# Patient Record
Sex: FEMALE | Race: WHITE
[De-identification: names, ages, dates, MRNs, and addresses within clinical notes are randomized per-mention and may not be internally consistent; named-entity substitution may affect disease eponyms.]

---

## 2017-05-11 ENCOUNTER — HOSPITAL ENCOUNTER (OUTPATIENT)
Dept: HOSPITAL 45 - C.LABBC | Age: 82
Discharge: HOME | End: 2017-05-11
Attending: FAMILY MEDICINE
Payer: COMMERCIAL

## 2017-05-11 DIAGNOSIS — M85.842: ICD-10-CM

## 2017-05-11 DIAGNOSIS — M25.50: Primary | ICD-10-CM

## 2017-05-11 DIAGNOSIS — M19.042: ICD-10-CM

## 2017-05-11 DIAGNOSIS — M19.041: ICD-10-CM

## 2017-05-11 DIAGNOSIS — M85.841: ICD-10-CM

## 2017-05-11 NOTE — DIAGNOSTIC IMAGING REPORT
LEFT FOREARM 2 VIEWS ROUTINE



CLINICAL HISTORY: POLYARTHRALGIA pain



COMPARISON: None.



DISCUSSION: The bones and joint spaces appear intact. There is no evidence of

fracture, dislocation or bony disease. There is no evidence for soft tissue

swelling.



IMPRESSION: Negative study.







Electronically signed by:  Herman Maurer M.D.

5/11/2017 4:28 PM



Dictated Date/Time:  5/11/2017 4:28 PM

## 2017-05-11 NOTE — DIAGNOSTIC IMAGING REPORT
RIGHT ELBOW MIN 3 VIEWS ROUTINE



CLINICAL HISTORY: Right elbow pain.    



COMPARISON: None



FINDINGS:  Alignment of the right elbow is anatomic. There is no joint effusion.

No fracture is identified. There is minimal narrowing of the radiocapitellar

joint space. There is mild soft tissue swelling overlying olecranon.



IMPRESSION: 



1. No acute fracture or joint effusion of the right elbow.



2. Minimal soft tissue swelling overlying the olecranon.







Electronically signed by:  Piero Cornelius M.D.

5/11/2017 4:24 PM



Dictated Date/Time:  5/11/2017 4:23 PM

## 2017-05-11 NOTE — DIAGNOSTIC IMAGING REPORT
RIGHT HAND MIN 3 VIEWS ROUTINE



CLINICAL HISTORY: POLYARTHRALGIA

Right pain



COMPARISON: None.



DISCUSSION: Generalized degenerative change. Osteopenia. Considerable

degenerative change first as well as second carpal metacarpal joint. There is no

evidence for soft tissue swelling.



IMPRESSION: Considerable degenerative change. Osteopenia.







Electronically signed by:  Herman Maurer M.D.

5/11/2017 4:28 PM



Dictated Date/Time:  5/11/2017 4:24 PM

## 2017-05-11 NOTE — DIAGNOSTIC IMAGING REPORT
RIGHT FOREARM 2 VIEWS ROUTINE



CLINICAL HISTORY: POLYARTHRALGIA

Right pain



COMPARISON: None.



DISCUSSION: The bones and joint spaces appear intact. There is no evidence of

fracture, dislocation or bony disease. There is no evidence for soft tissue

swelling.



IMPRESSION: Negative study.







Electronically signed by:  Herman Maurer M.D.

5/11/2017 4:23 PM



Dictated Date/Time:  5/11/2017 4:23 PM

## 2017-05-11 NOTE — DIAGNOSTIC IMAGING REPORT
LEFT HAND MIN 3 VIEWS ROUTINE



CLINICAL HISTORY: POLYARTHRALGIA pain



COMPARISON: None.



DISCUSSION: Generalized degenerative change. Osteopenia. Significant

degenerative change of the distal interphalangeal joints as well as the first

and to lesser extent second carpometacarpal joint. There is no evidence for soft

tissue swelling.



IMPRESSION: Considerable degenerative change. Osteopenia.







Electronically signed by:  Herman Maurer M.D.

5/11/2017 4:24 PM



Dictated Date/Time:  5/11/2017 4:24 PM

## 2017-05-11 NOTE — DIAGNOSTIC IMAGING REPORT
LEFT ELBOW MIN 3 VIEWS ROUTINE



CLINICAL HISTORY: Left elbow pain.    



COMPARISON: None



FINDINGS:  Alignment of left elbow is anatomic. There is no fracture. There is

no evidence for joint effusion. No osseous lesion is identified. There is

minimal soft tissue swelling overlying olecranon.



IMPRESSION: 



1. No acute fracture or joint effusion of the left elbow.



2. Minimal soft tissue swelling overlying the olecranon.







Electronically signed by:  Piero Cornelius M.D.

5/11/2017 4:25 PM



Dictated Date/Time:  5/11/2017 4:24 PM

## 2017-07-05 ENCOUNTER — HOSPITAL ENCOUNTER (EMERGENCY)
Dept: HOSPITAL 45 - C.EDA | Age: 82
LOS: 1 days | Discharge: HOME | End: 2017-07-06
Payer: COMMERCIAL

## 2017-07-05 VITALS
HEIGHT: 64.02 IN | BODY MASS INDEX: 20.17 KG/M2 | HEIGHT: 64.02 IN | WEIGHT: 118.17 LBS | WEIGHT: 118.17 LBS | BODY MASS INDEX: 20.17 KG/M2

## 2017-07-05 DIAGNOSIS — E78.5: ICD-10-CM

## 2017-07-05 DIAGNOSIS — I10: ICD-10-CM

## 2017-07-05 DIAGNOSIS — Z82.49: ICD-10-CM

## 2017-07-05 DIAGNOSIS — Z98.51: ICD-10-CM

## 2017-07-05 DIAGNOSIS — I35.9: ICD-10-CM

## 2017-07-05 DIAGNOSIS — Z91.018: ICD-10-CM

## 2017-07-05 DIAGNOSIS — R07.89: ICD-10-CM

## 2017-07-05 DIAGNOSIS — R00.2: Primary | ICD-10-CM

## 2017-07-05 DIAGNOSIS — R53.83: ICD-10-CM

## 2017-07-05 DIAGNOSIS — I48.91: ICD-10-CM

## 2017-07-05 DIAGNOSIS — Z88.8: ICD-10-CM

## 2017-07-05 DIAGNOSIS — I44.0: ICD-10-CM

## 2017-07-05 DIAGNOSIS — Z80.9: ICD-10-CM

## 2017-07-05 DIAGNOSIS — Z79.82: ICD-10-CM

## 2017-07-05 DIAGNOSIS — Z79.899: ICD-10-CM

## 2017-07-05 DIAGNOSIS — Z88.0: ICD-10-CM

## 2017-07-06 VITALS — OXYGEN SATURATION: 96 % | SYSTOLIC BLOOD PRESSURE: 142 MMHG | HEART RATE: 65 BPM | DIASTOLIC BLOOD PRESSURE: 52 MMHG

## 2017-07-06 VITALS — OXYGEN SATURATION: 97 % | TEMPERATURE: 97.7 F

## 2017-07-06 LAB
ANION GAP SERPL CALC-SCNC: 9 MMOL/L (ref 3–11)
BASOPHILS # BLD: 0.02 K/UL (ref 0–0.2)
BASOPHILS NFR BLD: 0.3 %
BUN SERPL-MCNC: 11 MG/DL (ref 7–18)
BUN/CREAT SERPL: 17.4 (ref 10–20)
CALCIUM SERPL-MCNC: 9.4 MG/DL (ref 8.5–10.1)
CHLORIDE SERPL-SCNC: 103 MMOL/L (ref 98–107)
CKMB/CK RATIO: 2.3 (ref 0–3)
CO2 SERPL-SCNC: 26 MMOL/L (ref 21–32)
COMPLETE: YES
CREAT CL PREDICTED SERPL C-G-VRATE: 59.3 ML/MIN
CREAT SERPL-MCNC: 0.63 MG/DL (ref 0.6–1.2)
EOSINOPHIL NFR BLD AUTO: 222 K/UL (ref 130–400)
GLUCOSE SERPL-MCNC: 104 MG/DL (ref 70–99)
HCT VFR BLD CALC: 38.7 % (ref 37–47)
IG%: 0.3 %
IMM GRANULOCYTES NFR BLD AUTO: 28.1 %
INR PPP: 1 (ref 0.9–1.1)
LYMPHOCYTES # BLD: 1.96 K/UL (ref 1.2–3.4)
MAGNESIUM SERPL-MCNC: 2.2 MG/DL (ref 1.8–2.4)
MCH RBC QN AUTO: 32.2 PG (ref 25–34)
MCHC RBC AUTO-ENTMCNC: 35.1 G/DL (ref 32–36)
MCV RBC AUTO: 91.7 FL (ref 80–100)
MONOCYTES NFR BLD: 11.3 %
NEUTROPHILS # BLD AUTO: 1 %
NEUTROPHILS NFR BLD AUTO: 59 %
PARTIAL THROMBOPLASTIN RATIO: 1
PMV BLD AUTO: 11.4 FL (ref 7.4–10.4)
POTASSIUM SERPL-SCNC: 3.8 MMOL/L (ref 3.5–5.1)
PROTHROMBIN TIME: 10.6 SECONDS (ref 9–12)
RBC # BLD AUTO: 4.22 M/UL (ref 4.2–5.4)
SODIUM SERPL-SCNC: 138 MMOL/L (ref 136–145)
WBC # BLD AUTO: 6.98 K/UL (ref 4.8–10.8)

## 2017-07-06 NOTE — EMERGENCY ROOM VISIT NOTE
History


Report prepared by Jamia:  Shaista Tyler


Under the Supervision of:  Dr. Kevyn Gold M.D.


First contact with patient:  00:07


Chief Complaint:  CARDIAC ASSESSMENT


Stated Complaint:  ABDOMINAL PAIN/CHEST PAIN


Nursing Triage Summary:  


Patient arrived ALS for evaluation of "not feeling well" for days. Patient 


reports an unusual feeling in stomach, denies it as pain and it sometimes it 


goes into her chest. Patient had drs nathaniel scheduled for tomorrow morning but 


stated she couldn't wait that long. Denies any other symptoms.





History of Present Illness


The patient is a 82 year old female who presents to the Emergency Room with 

complaints of intermittent irregular heart beat beginning 2 days ago. The 

patient states that she has an appointment with Dr. Fisher tomorrow morning and 

she called him today when she began to experience symptoms. She notes that he 

advised that she wait until her appointment tomorrow but after prolonged 

symptoms she decided to come in to the ED. She reports that over the last 2 

days she has been feeling her heart beating irregularly. She notes that she 

took Eliquis 2 days ago after feeling her atrial fibrillation act up and 

experienced dizziness that she has had before after taking Eliquis in the 

hospital. The patient states that she has also been having a discomfort in her 

stomach and chest that she reports is not painful at all. She notes that she 

has been taking Metoprolol and is taking Tumeric 3 times a day that she 

believes may be causing her symptoms. She reports that she got a flu shot 1 

year ago that has caused her to have muscular pains as well. She denies any 

chest pain.





   Source of History:  patient


   Onset:  2 days ago


   Position:  other (irregular heart beat)


   Timing:  intermittent


   Associated Symptoms:  No chest pain


Note:


She complains of abdominal discomfort radiating to the chest and dizziness.





Review of Systems


See HPI for pertinent positives & negatives. A total of 10 systems reviewed and 

were otherwise negative.





Past Medical & Surgical


Medical Problems:


(1) Aortic Valve Disorder


(2) Atrial fibrillation with rapid ventricular response


(3) History of repair of coarctation of aorta


(4) Hyperlipidemia Nec/Nos


(5) Hypertension Nos


(6) Patent ductus arteriosus


(7) Tubal Ligation Status








Family History





FHx: cancer


FHx: heart disease





Social History


Smoking Status:  Never Smoker


Alcohol Use:  none


Drug Use:  none


Marital Status:  


Housing Status:  lives alone


Occupation Status:  retired





Current/Historical Medications


Scheduled


Ascorbic Acid (Vitamin C), 500 MG PO DAILY


Aspirin (Aspirin Ec), 81 MG PO DAILY


Calcium W/ Magnesium (Calcium Magnesium 750), 1 TAB PO DAILY


Metoprolol Succinate (Metoprolol Succinate ER), 25 MG PO BID


Vitamin E (E-400), 400 UNITS PO DAILY


[Tumeric], Unknown Dose PO DAILY





Scheduled PRN


Trolamine Salicylate (Aspercreme), 1 APPLN TOP AS DIRECTED PRN for Pain





Allergies


Coded Allergies:  


     Epinephrine (Verified  Allergy, Severe, TACHYCARDIA, 7/6/17)


     Apixaban (Verified  Allergy, Unknown, WEAKNESS/DIFFICULTY BREATHING, 7/6/17

)


     Honey (Verified  Allergy, Unknown, ANAPHYLAXIS, 7/6/17)


 wild flower honey


     Penicillins (Verified  Allergy, Unknown, UNKNOWN, 7/6/17)


     Flu Virus Vaccine (Verified  Adverse Reaction, Intermediate, "ARTHRITIS 

SYMPTOMS", 7/6/17)


     Cyclobenzaprine (Verified  Adverse Reaction, Unknown, lethargy, 7/6/17)





Physical Exam


Vital Signs











  Date Time  Temp Pulse Resp B/P (MAP) Pulse Ox O2 Delivery O2 Flow Rate FiO2


 


7/6/17 01:30  65 18 142/52 96 Room Air  


 


7/6/17 00:46  62      


 


7/6/17 00:03 36.5 67 18 156/73 97 Room Air  


 


7/6/17 00:03     97 Room Air  











Physical Exam


GENERAL: Patient is elderly appearing and in no acute distress.


HEENT: No acute trauma, normocephalic atraumatic, mucous membranes moist, no 

nasal congestion, no scleral icterus.


NECK: No stridor, no adenopathy, no meningismus, trachea is midline.


LUNGS: No dyspnea. Clear to auscultation and equal bilaterally. No wheeze, no 

rhonchi.


HEART: Regular rate and rhythm.  No rubs, gallops appreciated. Systolic murmur. 


ABDOMEN: Soft, nontender, bowel sounds positive, no masses appreciated, no 

peritonitis.


BACK: No midline tenderness, no CVA tenderness


EXTREMITIES: Normal motion all extremities, no cyanosis, no edema.


NEUROLOGIC: Alert and oriented, no acute motor or sensory deficits, no focal 

weakness, cranial nerves grossly intact.


SKIN: No rash, no jaundice, no diaphoresis.





Medical Decision & Procedures


ER Provider


Diagnostic Interpretation:


X ray results are stated below per my interpretation:


Chest: 1 view: No infiltrate, no effusion, normal cardiac border.  Scarring/

atelectasis in left lower lobe similar to previous chest x-ray.





Laboratory Results


7/5/17 23:50








Red Blood Count 4.22, Mean Corpuscular Volume 91.7, Mean Corpuscular Hemoglobin 

32.2, Mean Corpuscular Hemoglobin Concent 35.1, Mean Platelet Volume 11.4, 

Neutrophils (%) (Auto) 59.0, Lymphocytes (%) (Auto) 28.1, Monocytes (%) (Auto) 

11.3, Eosinophils (%) (Auto) 1.0, Basophils (%) (Auto) 0.3, Neutrophils # (Auto

) 4.12, Lymphocytes # (Auto) 1.96, Monocytes # (Auto) 0.79, Eosinophils # (Auto

) 0.07, Basophils # (Auto) 0.02





7/5/17 23:50

















Test


  7/5/17


23:50


 


White Blood Count


  6.98 K/uL


(4.8-10.8)


 


Red Blood Count


  4.22 M/uL


(4.2-5.4)


 


Hemoglobin


  13.6 g/dL


(12.0-16.0)


 


Hematocrit 38.7 % (37-47) 


 


Mean Corpuscular Volume


  91.7 fL


()


 


Mean Corpuscular Hemoglobin


  32.2 pg


(25-34)


 


Mean Corpuscular Hemoglobin


Concent 35.1 g/dl


(32-36)


 


Platelet Count


  222 K/uL


(130-400)


 


Mean Platelet Volume


  11.4 fL


(7.4-10.4)


 


Neutrophils (%) (Auto) 59.0 % 


 


Lymphocytes (%) (Auto) 28.1 % 


 


Monocytes (%) (Auto) 11.3 % 


 


Eosinophils (%) (Auto) 1.0 % 


 


Basophils (%) (Auto) 0.3 % 


 


Neutrophils # (Auto)


  4.12 K/uL


(1.4-6.5)


 


Lymphocytes # (Auto)


  1.96 K/uL


(1.2-3.4)


 


Monocytes # (Auto)


  0.79 K/uL


(0.11-0.59)


 


Eosinophils # (Auto)


  0.07 K/uL


(0-0.5)


 


Basophils # (Auto)


  0.02 K/uL


(0-0.2)


 


RDW Standard Deviation


  43.7 fL


(36.4-46.3)


 


RDW Coefficient of Variation


  13.0 %


(11.5-14.5)


 


Immature Granulocyte % (Auto) 0.3 % 


 


Immature Granulocyte # (Auto)


  0.02 K/uL


(0.00-0.02)


 


Prothrombin Time


  10.6 SECONDS


(9.0-12.0)


 


Prothromb Time International


Ratio 1.0 (0.9-1.1) 


 


 


Activated Partial


Thromboplast Time 26.0 SECONDS


(21.0-31.0)


 


Partial Thromboplastin Ratio 1.0 


 


Anion Gap


  9.0 mmol/L


(3-11)


 


Est Creatinine Clear Calc


Drug Dose 59.3 ml/min 


 


 


Estimated GFR (


American) 97.5 


 


 


Estimated GFR (Non-


American 84.1 


 


 


BUN/Creatinine Ratio 17.4 (10-20) 


 


Calcium Level


  9.4 mg/dl


(8.5-10.1)


 


Magnesium Level


  2.2 mg/dl


(1.8-2.4)


 


Total Creatine Kinase


  99 U/L


()


 


Creatine Kinase MB


  2.3 ng/ml


(0.5-3.6)


 


Creatine Kinase MB Ratio 2.3 (0-3.0) 


 


Troponin I


  0.018 ng/ml


(0-0.045)





Laboratory results as reviewed by me.





ECG


Indication:  other (irregular heart rate)


Rate (beats per minute):  65


Rhythm:  normal sinus


Findings:  1st degree AV block, no acute ischemic change, no ectopy





ED Course


0007: The patient was evaluated in room A12B. A complete history and physical 

exam was performed.





0101: I reevaluated the patient. She refuses to stay in the hospital but will 

stay on the monitor for another half hour. She will follow up with her doctor 

tomorrow and understands that we have not completely ruled out a heart attack 

or other abnormality. 





0130: Reevaluated the patient. Discussed results and discharge instructions: 

She verbalized understanding and agreement.   The patient is ready for 

discharge.





Medical Decision


Differential: NSR, SVT, PACs, PVCs, Cardiac Dysrhythmia, Endocrine Dysfunction, 

Eletrolyte/Metabolic Abnormality, Pulmonary Embolism, Infectious, GI, amongst 

other pathologies entertained.





Medication Reconciliation:  I attest that I have personally reviewed the patient

's current medication list.


Blood pressure screening: Patient was found to have an elevated blood pressure 

and was referred to their cardiologist in the morning for recheck and further 

treatment.





82 yr old female arrives for evaluation of periodic palpitations over the last 

few days associated with fatigue, lack of sleep, and chest/body discomfort.  

Wide ranging complaints from bed not comfortable to room being too cold that 

seem impossible to rectify at this time.  She has unremarkable work-up, no 

palpitations here.  Advised coming in for monitoring and further evaluation.  

Admits not taking her full dose of metoprolol as regularly as she was supposed 

to.  She refuses to stay for further monitoring even after extensive 

discussions.  She has appointment in AM and is aware she can return at any time 

if worsening or other concerns.





Impression





 Primary Impression:  


 Intermittent palpitations


 Additional Impressions:  


 Chest discomfort


 Fatigue





Scribe Attestation


The scribe's documentation has been prepared under my direction and personally 

reviewed by me in its entirety. I confirm that the note above accurately 

reflects all work, treatment, procedures, and medical decision making performed 

by me.





Departure Information


Dispostion


Home / Self-Care





Referrals


Noemy Burns D.O. (PCP)





Forms


IMPORTANT VISIT INFORMATION





Patient Instructions


My [facility]





Additional Instructions





Keep your appointment with your cardiologist for tomorrow morning.


Return if you wish further evaluation, or call 911 if worsening symptoms.


We are always here to help.





Your blood pressure was elevated during this visit.  This is quite common in 

many people who are being evaluated in the Emergency Department for many 

reasons.  However, it is important that you have your Primary Care Provider 

recheck your blood pressure and discuss whether treatment will be needed.  


Long term elevated blood pressure can lead to strokes, heart attacks, kidney 

failure amongst other medical issues.  If you develop severe headaches, chest 

pain, weakness in arms or legs, or other concerning symptoms call 911.





Problem Qualifiers

## 2017-07-06 NOTE — DIAGNOSTIC IMAGING REPORT
SINGLE VIEW CHEST



CLINICAL HISTORY:  Atypical chest pain.



FINDINGS: An AP, portable, upright chest radiograph is compared to study dated

8/18/2016 and correlated with chest CT dated 7/25/2016. The examination is

degraded by portable technique and patient rotation.  The heart is enlarged and

there is atherosclerotic calcification of the thoracic aorta. The pulmonary

vasculature is noncongested. Chronic interstitial thickening is unchanged. There

is chronic elevation of left hemidiaphragm and bibasilar atelectasis. No

airspace consolidation or pleural effusion is identified. No pneumothorax is

seen. The skeletal structures are osteopenic. The bony thorax is grossly intact.



IMPRESSION: Cardiomegaly with no acute cardiopulmonary abnormality.







Electronically signed by:  Javier Rendon M.D.

7/6/2017 7:28 AM



Dictated Date/Time:  7/6/2017 7:27 AM

## 2017-10-06 ENCOUNTER — HOSPITAL ENCOUNTER (OUTPATIENT)
Dept: HOSPITAL 45 - C.LABBC | Age: 82
Discharge: HOME | End: 2017-10-06
Payer: COMMERCIAL

## 2017-10-06 DIAGNOSIS — E87.6: Primary | ICD-10-CM

## 2017-10-06 LAB
ANION GAP SERPL CALC-SCNC: 6 MMOL/L (ref 3–11)
BUN SERPL-MCNC: 13 MG/DL (ref 7–18)
BUN/CREAT SERPL: 19.6 (ref 10–20)
CALCIUM SERPL-MCNC: 9 MG/DL (ref 8.5–10.1)
CHLORIDE SERPL-SCNC: 103 MMOL/L (ref 98–107)
CO2 SERPL-SCNC: 27 MMOL/L (ref 21–32)
CREAT SERPL-MCNC: 0.67 MG/DL (ref 0.6–1.2)
GLUCOSE SERPL-MCNC: 89 MG/DL (ref 70–99)
MAGNESIUM SERPL-MCNC: 2.3 MG/DL (ref 1.8–2.4)
POTASSIUM SERPL-SCNC: 4.5 MMOL/L (ref 3.5–5.1)
SODIUM SERPL-SCNC: 136 MMOL/L (ref 136–145)

## 2017-11-09 ENCOUNTER — HOSPITAL ENCOUNTER (INPATIENT)
Dept: HOSPITAL 45 - C.EDA | Age: 82
LOS: 2 days | Discharge: HOME HEALTH | DRG: 310 | End: 2017-11-11
Attending: HOSPITALIST | Admitting: HOSPITALIST
Payer: COMMERCIAL

## 2017-11-09 VITALS
OXYGEN SATURATION: 97 % | DIASTOLIC BLOOD PRESSURE: 59 MMHG | TEMPERATURE: 97.52 F | SYSTOLIC BLOOD PRESSURE: 104 MMHG | HEART RATE: 78 BPM

## 2017-11-09 VITALS
HEIGHT: 64 IN | BODY MASS INDEX: 19.76 KG/M2 | WEIGHT: 115.74 LBS | BODY MASS INDEX: 19.76 KG/M2 | HEIGHT: 64 IN | WEIGHT: 115.74 LBS

## 2017-11-09 VITALS — HEART RATE: 69 BPM | SYSTOLIC BLOOD PRESSURE: 93 MMHG | TEMPERATURE: 98.06 F | DIASTOLIC BLOOD PRESSURE: 80 MMHG

## 2017-11-09 VITALS
HEART RATE: 83 BPM | OXYGEN SATURATION: 94 % | DIASTOLIC BLOOD PRESSURE: 71 MMHG | SYSTOLIC BLOOD PRESSURE: 110 MMHG | TEMPERATURE: 98.42 F

## 2017-11-09 VITALS
HEART RATE: 98 BPM | OXYGEN SATURATION: 98 % | DIASTOLIC BLOOD PRESSURE: 67 MMHG | TEMPERATURE: 97.34 F | SYSTOLIC BLOOD PRESSURE: 92 MMHG

## 2017-11-09 DIAGNOSIS — I44.0: ICD-10-CM

## 2017-11-09 DIAGNOSIS — I10: ICD-10-CM

## 2017-11-09 DIAGNOSIS — Z88.0: ICD-10-CM

## 2017-11-09 DIAGNOSIS — F41.9: ICD-10-CM

## 2017-11-09 DIAGNOSIS — I35.0: ICD-10-CM

## 2017-11-09 DIAGNOSIS — E78.5: ICD-10-CM

## 2017-11-09 DIAGNOSIS — Z79.82: ICD-10-CM

## 2017-11-09 DIAGNOSIS — I48.0: Primary | ICD-10-CM

## 2017-11-09 DIAGNOSIS — E11.9: ICD-10-CM

## 2017-11-09 DIAGNOSIS — Z79.899: ICD-10-CM

## 2017-11-09 LAB
ALP SERPL-CCNC: 51 U/L (ref 45–117)
ALT SERPL-CCNC: 17 U/L (ref 12–78)
ANION GAP SERPL CALC-SCNC: 6 MMOL/L (ref 3–11)
APPEARANCE UR: CLEAR
AST SERPL-CCNC: 15 U/L (ref 15–37)
BASOPHILS # BLD: 0.02 K/UL (ref 0–0.2)
BASOPHILS NFR BLD: 0.3 %
BILIRUB UR-MCNC: (no result) MG/DL
BUN SERPL-MCNC: 13 MG/DL (ref 7–18)
BUN/CREAT SERPL: 25 (ref 10–20)
CALCIUM SERPL-MCNC: 8.3 MG/DL (ref 8.5–10.1)
CHLORIDE SERPL-SCNC: 105 MMOL/L (ref 98–107)
CKMB/CK RATIO: 2 (ref 0–3)
CO2 SERPL-SCNC: 25 MMOL/L (ref 21–32)
COLOR UR: YELLOW
COMPLETE: YES
CREAT CL PREDICTED SERPL C-G-VRATE: 70.6 ML/MIN
CREAT SERPL-MCNC: 0.54 MG/DL (ref 0.6–1.2)
EOSINOPHIL NFR BLD AUTO: 212 K/UL (ref 130–400)
GLUCOSE SERPL-MCNC: 110 MG/DL (ref 70–99)
HCT VFR BLD CALC: 37.5 % (ref 37–47)
IG%: 0.3 %
IMM GRANULOCYTES NFR BLD AUTO: 19.1 %
INR PPP: 1 (ref 0.9–1.1)
LYMPHOCYTES # BLD: 1.33 K/UL (ref 1.2–3.4)
MAGNESIUM SERPL-MCNC: 2.1 MG/DL (ref 1.8–2.4)
MANUAL MICROSCOPIC REQUIRED?: NO
MCH RBC QN AUTO: 31.4 PG (ref 25–34)
MCHC RBC AUTO-ENTMCNC: 33.9 G/DL (ref 32–36)
MCV RBC AUTO: 92.8 FL (ref 80–100)
MONOCYTES NFR BLD: 7.8 %
NEUTROPHILS # BLD AUTO: 0.9 %
NEUTROPHILS NFR BLD AUTO: 71.6 %
NITRITE UR QL STRIP: (no result)
PARTIAL THROMBOPLASTIN RATIO: 1
PH UR STRIP: 6.5 [PH] (ref 4.5–7.5)
PMV BLD AUTO: 10.8 FL (ref 7.4–10.4)
POTASSIUM SERPL-SCNC: 3.8 MMOL/L (ref 3.5–5.1)
PROTHROMBIN TIME: 10.7 SECONDS (ref 9–12)
RBC # BLD AUTO: 4.04 M/UL (ref 4.2–5.4)
REVIEW REQ?: NO
SODIUM SERPL-SCNC: 136 MMOL/L (ref 136–145)
SP GR UR STRIP: 1.02 (ref 1–1.03)
TSH SERPL-ACNC: 1.76 UIU/ML (ref 0.3–4.5)
URINE BILL WITH OR WITHOUT MIC: (no result)
URINE EPITHELIAL CELL AUTO: (no result) /LPF (ref 0–5)
UROBILINOGEN UR-MCNC: (no result) MG/DL
WBC # BLD AUTO: 6.95 K/UL (ref 4.8–10.8)
ZZUR CULT IF INDIC CLEAN CATCH: NO

## 2017-11-09 RX ADMIN — CLINDAMYCIN HYDROCHLORIDE SCH MG: 150 CAPSULE ORAL at 17:19

## 2017-11-09 RX ADMIN — LORAZEPAM PRN MG: 0.5 TABLET ORAL at 20:50

## 2017-11-09 RX ADMIN — METOPROLOL SUCCINATE SCH MG: 25 TABLET, EXTENDED RELEASE ORAL at 20:50

## 2017-11-09 RX ADMIN — CLINDAMYCIN HYDROCHLORIDE SCH MG: 150 CAPSULE ORAL at 20:52

## 2017-11-09 RX ADMIN — ALUMINUM HYDROXIDE, MAGNESIUM HYDROXIDE, AND DIMETHICONE PRN ML: 400; 400; 40 SUSPENSION ORAL at 18:04

## 2017-11-09 NOTE — EMERGENCY ROOM VISIT NOTE
History


Report prepared by Jamia:  Angelina Garcia


Under the Supervision of:  Dr. Manjula Rossi M.D.


First contact with patient:  06:45


Chief Complaint:  WEAKNESS


Stated Complaint:  WEAKNESS/TACHYCARDIA


Nursing Triage Summary:  


Pt reports waking this AM and her heart was racing and had upper back pain.  Pt 


with +cardiac history and states she has had heart surgeries.  Pt took her 


metoprolol and clonidine this AM.  Feels weak.  Had a recent dental procedure 1 


week ago.





History of Present Illness


The patient is a 82 year old female who presents to the Emergency Room with 

complaints of constant weakness beginning this morning. The patient lives at 

home alone. She woke up this morning and was feeling very weak. She also 

reports upper back pain and feeling like her heart was racing. She states that 

this happens a couple of times a year. Typically it resolves when she takes her 

medications. The patient took her clonidine and metoprolol this morning without 

any relief of her symptoms. She still feels like her heart is racing, and she 

is still having upper back pain. She rates her current pain as a 4/10 in 

severity. The patient denies fevers and chest pain. She was feeling fine 

yesterday. She had a normal bowel movement yesterday and was eating normally 

yesterday. She does note some recent dysuria. The patient reports a history of a

-flutter.





   Source of History:  patient


   Onset:  this morning


   Position:  other (global)


   Symptom Intensity:  4/10


   Quality:  other (weakness)


   Timing:  constant


   Associated Symptoms:  + back pain, + urinary symptoms, No fevers, No chest 

pain, No diarrhea


Note:


Pt reports heart racing.





Review of Systems


See HPI for pertinent positives & negatives. A total of 10 systems reviewed and 

were otherwise negative.





Past Medical & Surgical


Medical Problems:


(1) Afib


(2) Aortic Valve Disorder


(3) History of repair of coarctation of aorta


(4) Hyperlipidemia Nec/Nos


(5) Hypertension Nos


(6) Patent ductus arteriosus


(7) Tubal Ligation Status








Family History





FHx: cancer


FHx: heart disease





Social History


Smoking Status:  Never Smoker


Alcohol Use:  none


Drug Use:  none


Marital Status:  


Housing Status:  lives alone


Occupation Status:  retired





Current/Historical Medications


Scheduled


Ascorbic Acid (Vitamin C), 500 MG PO DAILY


Aspirin (Aspirin Ec), 81 MG PO DAILY


Calcium W/ Magnesium (Calcium Magnesium 750), 1 TAB PO DAILY


Metoprolol Succinate (Metoprolol Succinate ER), 25 MG PO BID


Vitamin E (E-400), 400 UNITS PO DAILY


[Tumeric], Unknown Dose PO DAILY





Scheduled PRN


Trolamine Salicylate (Aspercreme), 1 APPLN TOP AS DIRECTED PRN for Pain





Allergies


Coded Allergies:  


     Epinephrine (Verified  Allergy, Severe, TACHYCARDIA, 11/9/17)


     Apixaban (Verified  Allergy, Unknown, WEAKNESS/DIFFICULTY BREATHING, 11/9/ 17)


     Honey (Verified  Allergy, Unknown, ANAPHYLAXIS, 11/9/17)


 wild flower honey


     Penicillins (Verified  Allergy, Unknown, UNKNOWN, 11/9/17)


     Flu Virus Vaccine (Verified  Adverse Reaction, Intermediate, "ARTHRITIS 

SYMPTOMS", 11/9/17)


     Cyclobenzaprine (Verified  Adverse Reaction, Unknown, lethargy, 11/9/17)





Physical Exam


Vital Signs











  Date Time  Temp Pulse Resp B/P (MAP) Pulse Ox O2 Delivery O2 Flow Rate FiO2


 


11/9/17 11:43  118      


 


11/9/17 11:20  120 20 123/99 95 Room Air  


 


11/9/17 11:12  103      


 


11/9/17 11:03  129  109/62    


 


11/9/17 10:15  120  118/89 97 Room Air  


 


11/9/17 09:17  90 14 84/48 97 Room Air  


 


11/9/17 09:09  123 19 94/69    


 


11/9/17 08:00  90 18 90/51    


 


11/9/17 07:27  79  99/52 95 Room Air  


 


11/9/17 06:54     96 Room Air  


 


11/9/17 06:50  128      


 


11/9/17 06:47 36.5 135 16 121/90 95 Room Air  











Physical Exam


Vital signs reviewed.





General: Chronically ill female, in no significant distress.





HEENT: No scleral icterus, PERRLA, neck supple.  Atraumatic.





Cardiovascular: Rapid rate and irregular rhythm, no extra sounds.





Pulmonary: Clear to auscultation bilaterally, normal work of breathing.





Abdomen: Soft, nontender, nondistended, positive bowel sounds.





Musculoskeletal: Atraumatic, no peripheral edema.





Neurologic: Patient awake alert and oriented x 3, full strength in all 4 

extremities.  Cranial nerves 2 through 12 grossly intact.





Skin: Warm, dry, no rash





Medical Decision & Procedures


ER Provider


Diagnostic Interpretation:


Radiology results as stated below per my review and radiologist interpretation:





CHEST ONE VIEW PORTABLE





CLINICAL HISTORY: rapid atrial fib, weakness dyspnea





COMPARISON STUDY:  9/24/2017





FINDINGS: Mild stable cardiomegaly. Prominent pulmonary vasculature compared to


the prior study consistent with a component of congestive failure. Mild


pulmonary hypertension may be present with slight fullness of the central


pulmonary vasculature. 





IMPRESSION:  


1. Developing congestive heart failure.








2. Developing pulmonary arterial hypertension. 








The above report was generated using voice recognition software.  It may contain


grammatical, syntax or spelling errors.








Electronically signed by:  Herman Maurer M.D.


11/9/2017 7:16 AM





Dictated Date/Time:  11/9/2017 7:15 AM














Study: CT angiography of the chest





HISTORY: Aneurysm. Dissection.





FINDINGS: Pre and postcontrast enhanced CT scans of the chest with attention to


the thoracic aorta demonstrate considerable tortuosity and ectasia of the


thoracic aorta. findings of mild aneurysmal dilatation of the proximal


descending thoracic aorta the maximum diameter of 3 cm. This is unchanged from


the prior exam.





There is a focal area of a moderate stenosis immediately proximal to the


aneurysmal distention. This also is





considered stable.





No well-defined evidence for dissection is appreciated. This area of narrowing


is approximately 1.6 cm in maximum diameter and is unchanged.











Pulmonary vasculature enhances appropriately. There may be a component of mild


pulmonary arterial hypertension. This is unchanged. A left renal cyst is stable.


Moderate pulmonary vascular prominence suggesting a component of mild congestive


failure





IMPRESSION:


1. Generalized atherosclerotic change and ectasia thoracic aorta.


2. Moderate stenosis apex aortic arch with mild aneurysmal dilatation of the


proximal descending thoracic aorta.


3. All findings are stable compared to the prior study with no new or interval


change.


4. No evidence for dissection.


5. Mild components of congestive failure.


6. Mild pulmonary arterial hypertension unaltered from the prior study.








Electronically signed by:  Herman Maurer M.D.


11/9/2017 10:23 AM





Dictated Date/Time:  11/9/2017 10:09 AM





Laboratory Results


11/9/17 06:50








Red Blood Count 4.04, Mean Corpuscular Volume 92.8, Mean Corpuscular Hemoglobin 

31.4, Mean Corpuscular Hemoglobin Concent 33.9, Mean Platelet Volume 10.8, 

Neutrophils (%) (Auto) 71.6, Lymphocytes (%) (Auto) 19.1, Monocytes (%) (Auto) 

7.8, Eosinophils (%) (Auto) 0.9, Basophils (%) (Auto) 0.3, Neutrophils # (Auto) 

4.98, Lymphocytes # (Auto) 1.33, Monocytes # (Auto) 0.54, Eosinophils # (Auto) 

0.06, Basophils # (Auto) 0.02





11/9/17 06:50

















Test


  11/9/17


06:50 11/9/17


07:04 11/9/17


08:15


 


White Blood Count


  6.95 K/uL


(4.8-10.8) 


  


 


 


Red Blood Count


  4.04 M/uL


(4.2-5.4) 


  


 


 


Hemoglobin


  12.7 g/dL


(12.0-16.0) 


  


 


 


Hematocrit 37.5 % (37-47)   


 


Mean Corpuscular Volume


  92.8 fL


() 


  


 


 


Mean Corpuscular Hemoglobin


  31.4 pg


(25-34) 


  


 


 


Mean Corpuscular Hemoglobin


Concent 33.9 g/dl


(32-36) 


  


 


 


Platelet Count


  212 K/uL


(130-400) 


  


 


 


Mean Platelet Volume


  10.8 fL


(7.4-10.4) 


  


 


 


Neutrophils (%) (Auto) 71.6 %   


 


Lymphocytes (%) (Auto) 19.1 %   


 


Monocytes (%) (Auto) 7.8 %   


 


Eosinophils (%) (Auto) 0.9 %   


 


Basophils (%) (Auto) 0.3 %   


 


Neutrophils # (Auto)


  4.98 K/uL


(1.4-6.5) 


  


 


 


Lymphocytes # (Auto)


  1.33 K/uL


(1.2-3.4) 


  


 


 


Monocytes # (Auto)


  0.54 K/uL


(0.11-0.59) 


  


 


 


Eosinophils # (Auto)


  0.06 K/uL


(0-0.5) 


  


 


 


Basophils # (Auto)


  0.02 K/uL


(0-0.2) 


  


 


 


RDW Standard Deviation


  44.5 fL


(36.4-46.3) 


  


 


 


RDW Coefficient of Variation


  12.9 %


(11.5-14.5) 


  


 


 


Immature Granulocyte % (Auto) 0.3 %   


 


Immature Granulocyte # (Auto)


  0.02 K/uL


(0.00-0.02) 


  


 


 


Prothrombin Time


  10.7 SECONDS


(9.0-12.0) 


  


 


 


Prothromb Time International


Ratio 1.0 (0.9-1.1) 


  


  


 


 


Activated Partial


Thromboplast Time 26.1 SECONDS


(21.0-31.0) 


  


 


 


Partial Thromboplastin Ratio 1.0   


 


Anion Gap


  6.0 mmol/L


(3-11) 


  


 


 


Est Creatinine Clear Calc


Drug Dose 70.6 ml/min 


  


  


 


 


Estimated GFR (


American) 102.6 


  


  


 


 


Estimated GFR (Non-


American 88.5 


  


  


 


 


BUN/Creatinine Ratio 25.0 (10-20)   


 


Calcium Level


  8.3 mg/dl


(8.5-10.1) 


  


 


 


Magnesium Level


  2.1 mg/dl


(1.8-2.4) 


  


 


 


Total Bilirubin


  0.4 mg/dl


(0.2-1) 


  


 


 


Direct Bilirubin


  0.1 mg/dl


(0-0.2) 


  


 


 


Aspartate Amino Transf


(AST/SGOT) 15 U/L (15-37) 


  


  


 


 


Alanine Aminotransferase


(ALT/SGPT) 17 U/L (12-78) 


  


  


 


 


Alkaline Phosphatase


  51 U/L


() 


  


 


 


Total Creatine Kinase


  71 U/L


() 


  


 


 


Creatine Kinase MB


  1.4 ng/ml


(0.5-3.6) 


  


 


 


Creatine Kinase MB Ratio 2.0 (0-3.0)   


 


Total Protein


  6.8 gm/dl


(6.4-8.2) 


  


 


 


Albumin


  3.6 gm/dl


(3.4-5.0) 


  


 


 


Thyroid Stimulating Hormone


(TSH) 1.760 uIu/ml


(0.300-4.500) 


  


 


 


Bedside Troponin I


  


  < 0.030 ng/ml


(0-0.045) 


 


 


Urine Color   YELLOW 


 


Urine Appearance   CLEAR (CLEAR) 


 


Urine pH   6.5 (4.5-7.5) 


 


Urine Specific Gravity


  


  


  1.016


(1.000-1.030)


 


Urine Protein   NEG (NEG) 


 


Urine Glucose (UA)   NEG (NEG) 


 


Urine Ketones   1+ (NEG) 


 


Urine Occult Blood   NEG (NEG) 


 


Urine Nitrite   NEG (NEG) 


 


Urine Bilirubin   NEG (NEG) 


 


Urine Urobilinogen   NEG (NEG) 


 


Urine Leukocyte Esterase   SMALL (NEG) 


 


Urine WBC (Auto)   1-5 /hpf (0-5) 


 


Urine RBC (Auto)   0-4 /hpf (0-4) 


 


Urine Hyaline Casts (Auto)   1-5 /lpf (0-5) 


 


Urine Epithelial Cells (Auto)


  


  


  20-30 /lpf


(0-5)


 


Urine Bacteria (Auto)   NEG (NEG) 





Laboratory results per my review.





Medications Administered











 Medications


  (Trade)  Dose


 Ordered  Sig/Vipin


 Route  Start Time


 Stop Time Status Last Admin


Dose Admin


 


 Diltiazem HCl


  (Cardizem Inj)  20 mg  NOW  STAT


 IV  11/9/17 06:59


 11/9/17 07:03 DC 11/9/17 07:16


20 MG


 


 Sodium Chloride  250 ml @ 


 999 mls/hr  Q16M STAT


 IV  11/9/17 06:59


 11/9/17 07:14 DC 11/9/17 07:05


999 MLS/HR


 


 Diltiazem HCl


  (Cardizem Inj)  10 mg  NOW  STAT


 IV  11/9/17 09:08


 11/9/17 09:10 DC 11/9/17 09:15


10 MG


 


 Sodium Chloride  250 ml @ 


 999 mls/hr  Q16M STAT


 IV  11/9/17 09:19


 11/9/17 09:34 DC 11/9/17 09:30


999 MLS/HR


 


 Digoxin


  (Digoxin IV)  500 mcg  STK-MED ONCE


 .ROUTE  11/9/17 10:54


 11/9/17 10:55 DC 11/9/17 11:12


500 MCG


 


 Metoprolol


 Tartrate


  (Lopressor Iv)  5 mg  STK-MED ONCE


 .ROUTE  11/9/17 10:54


 11/9/17 10:55 DC 11/9/17 11:03


5 MG











ECG


Indication:  back/shoulder pain, palpitations


Rate (beats per minute):  134


Rhythm:  atrial fibrillation (rapid)


Findings:  nonspecific-ST abn, no ectopy


Comparison ECG Date:  repeat ECG 11/9/17


Change:


Repeat ECG reveals a-fib at 77 no ectopy, no ischemia, ST segments have 

resolved.





ED Course


0651: Past medical records reviewed. The patient was evaluated in room A9B. A 

complete history and physical examination was performed. 





0659:  ml @ 999 mls/hr IV, Cardizem 20 mg IV 





0908: Cardizem 10 mg IV





0919:  ml @ 999 mls/hr IV 





0922: I reassessed the patient at this time. She is feeling better and resting 

comfortably. I discussed the results and treatment plan with the patient. I 

answered all pertaining questions that she had. She expressed understanding and 

verbalized agreement.





0930: I spoke with Dr. Cordova. We discussed the patients case. She recommended 

a CT scan. The patient will be evaluated by the [facility] 

for further management.





Medical Decision


The patient is an 82 year old female who presents to the ED with complaints of 

weakness.  Differentials include metabolic, infection, hypo/hyperglycemia, 

electrolyte abnormalities, cardiac sources, intracerebral event, toxicologic, 

neurologic, as well as others were entertained. 





This patient was evaluated and appeared to be in no significant distress.  IV 

access was obtained and laboratory work was drawn.  Patient was placed on the 

cardiac monitor and found to be in a rapid atrial fibrillation.  Patient was 

given IV Cardizem 10 mg with significant improvement in heart rate.  Patient's 

repeat EKG shows no acute ischemia.  She did gradually return to a rapid atrial 

fibrillation and was given an additional 10 mg of IV Cardizem.  She did have 

periods of hypotension and required IV normal saline solution 250 mL bolus 2.  

Patient's laboratory work is otherwise unrevealing.  Due to the hypotension and 

return of the rapid atrial fibrillation she was discussed with the hospitalist 

service, Dr. Cordova.  She has recommended a CT angiogram of the chest to rule 

out aortic pathology.  The patient has had previous repair of a coarctation.  

This study is negative.  Patient will be evaluated by the hospitalist service 

for further management.





Medication Reconcilliation


Current Medication List:  was personally reviewed by me





Blood Pressure Screening


Patient's blood pressure:  Low blood pressure





Consults


Time Called:  0919


Consulting Physician:  Dr. Cordova


Returned Call:  0930


I spoke with Dr. Cordova. We discussed the patients case. She recommended a CT 

scan. The patient will be evaluated by the [facility] for 

further management.





Impression





 Primary Impression:  


 Atrial fibrillation with rapid ventricular response





Critical Care


I have personally spent greater than 35 minutes of critical care time in the 

direct management of this patient.  This includes bedside care, interpretation 

of diagnostic studies, and testing, discussion with consultants, patient, and 

family members, and other required patient management activities.  This 35 

minutes is in excess of all separately billable procedures.





Scribe Attestation


The scribe's documentation has been prepared under my direction and personally 

reviewed by me in its entirety. I confirm that the note above accurately 

reflects all work, treatment, procedures, and medical decision making performed 

by me.





Departure Information


Dispostion


Being Evaluated By Hospitalist





Referrals


Noemy Burns D.O. (PCP)





Patient Instructions


My [facility]

## 2017-11-09 NOTE — HISTORY AND PHYSICAL
History & Physical


Date & Time of Service:


Nov 9, 2017 at 13:22


Chief Complaint:


Weakness/Tachycardia


Primary Care Physician:


Noemy Burns D.O.


History of Present Illness


Source:  patient


Patient is a 81 y/o female, with PMHx of paroxysmal a.fib, HTN, 

hypercholesterolemia, and anxiety/depression, who presented to the ED because 

of complaints of back pain and palpitations. She was in her normal state of 

health until yesterday when she started to experience these symptoms. Patient 

denies any other associated symptoms. She was most recently admitted to [facility] on 

09/24/17 due to a.fib w/ RVR. She was treated with Digoxin and Metoprolol and 

discharged home the same day. She follows w/ Dr. See. Patient notes at that 

time she had a dental procedure prior to admission and was not placed on any 

antibiotics. When she was admitted to the hospital she was placed on antibiotic 

therapy. She recently had another dental procedure 1 week ago- she was not 

placed on any antibiotics, but feels she should be on it and this was the cause 

of her RVR today. Patient denies any fever, chills, sweats, lightheadedness, 

dizziness, vision changes, CP, edema, SOB, wheezing, cough, abdominal pain, 

nausea, vomiting, diarrhea, urinary symptoms, melena, numbness/tingling, 

weakness, anxiety/depression, active bleeding, or new skin discoloration/

changes.





Past Medical/Surgical History


PMHx:


s/p repair of a coarctation and PDA -- 1954 


Moderate aortic stenosis


Moderate aortic insufficiency


LVH


HTN


Hypercholesterolemia


Paroxysmal atrial fibrillation


Anxiety/depression





PSHx:


Appendectomy


Cholecystectomy


History of vein stripping


Tubal ligation


Hyperglycemia





Family History





FHx: cancer


FHx: heart disease





Social History


Smoking Status:  Never Smoker


Drug Use:  none


Marital Status:  


Housing status:  lives alone


Occupational Status:  retired





Immunizations


History of Influenza Vaccine:  Yes


History of Tetanus Vaccine?:  UNK


History of Pneumococcal:  Yes


Pneumococcal Date:  Nov 22, 2012


History of Hepatitis B Vaccine:  No





Multi-Drug Resistant Organisms


History of MDRO:  No





Allergies


Coded Allergies:  


     Epinephrine (Verified  Allergy, Severe, TACHYCARDIA, 11/9/17)


     Apixaban (Verified  Allergy, Unknown, WEAKNESS/DIFFICULTY BREATHING, 11/9/ 17)


     Honey (Verified  Allergy, Unknown, ANAPHYLAXIS, 11/9/17)


 wild flower honey


     Penicillins (Verified  Allergy, Unknown, UNKNOWN, 11/9/17)


     Flu Virus Vaccine (Verified  Adverse Reaction, Intermediate, "ARTHRITIS 

SYMPTOMS", 11/9/17)


     Cyclobenzaprine (Verified  Adverse Reaction, Unknown, lethargy, 11/9/17)





Home Medications


Scheduled


Ascorbic Acid (Vitamin C), 500 MG PO DAILY


Aspirin (Aspirin Ec), 81 MG PO DAILY


Calcium W/ Magnesium (Calcium Magnesium 750), 1 TAB PO DAILY


Metoprolol Succinate (Metoprolol Succinate ER), 25 MG PO BID


Vitamin E (E-400), 400 UNITS PO DAILY


[Tumeric], Unknown Dose PO DAILY





Scheduled PRN


Trolamine Salicylate (Aspercreme), 1 APPLN TOP AS DIRECTED PRN for Pain





Physical Exam


Vital Signs











  Date Time  Temp Pulse Resp B/P (MAP) Pulse Ox O2 Delivery O2 Flow Rate FiO2


 


11/9/17 12:50      Room Air  


 


11/9/17 11:43  118      


 


11/9/17 11:20  120 20 123/99 95 Room Air  


 


11/9/17 11:12  103      


 


11/9/17 11:03  129  109/62    


 


11/9/17 10:15  120  118/89 97 Room Air  


 


11/9/17 09:17  90 14 84/48 97 Room Air  


 


11/9/17 09:09  123 19 94/69    


 


11/9/17 08:00  90 18 90/51    


 


11/9/17 07:27  79  99/52 95 Room Air  


 


11/9/17 06:54     96 Room Air  


 


11/9/17 06:50  128      


 


11/9/17 06:47 36.5 135 16 121/90 95 Room Air  








General Appearance:  no apparent distress


Head:  normocephalic, atraumatic


Eyes:  PERRL


ENT:  hearing grossly normal


Neck:  supple


Respiratory/Chest:  lungs clear, no respiratory distress, no accessory muscle 

use


Cardiovascular:  + tachycardia, + systolic murmur, + irregularly irregular


Abdomen/GI:  normal bowel sounds, non tender, soft


Back:  normal inspection


Extremities/Musculoskelatal:  no calf tenderness, no pedal edema


Neurologic/Psych:  alert, oriented x 3, + pertinent finding (anxious )


Skin:  normal color, warm/dry, no rash





Diagnostics


Laboratory Results





Results Past 24 Hours








Test


  11/9/17


06:50 11/9/17


07:04 11/9/17


08:15 Range/Units


 


 


White Blood Count 6.95   4.8-10.8  K/uL


 


Red Blood Count 4.04   4.2-5.4  M/uL


 


Hemoglobin 12.7   12.0-16.0  g/dL


 


Hematocrit 37.5   37-47  %


 


Mean Corpuscular Volume 92.8     fL


 


Mean Corpuscular Hemoglobin 31.4   25-34  pg


 


Mean Corpuscular Hemoglobin


Concent 33.9


  


  


  32-36  g/dl


 


 


Platelet Count 212   130-400  K/uL


 


Mean Platelet Volume 10.8   7.4-10.4  fL


 


Neutrophils (%) (Auto) 71.6    %


 


Lymphocytes (%) (Auto) 19.1    %


 


Monocytes (%) (Auto) 7.8    %


 


Eosinophils (%) (Auto) 0.9    %


 


Basophils (%) (Auto) 0.3    %


 


Neutrophils # (Auto) 4.98   1.4-6.5  K/uL


 


Lymphocytes # (Auto) 1.33   1.2-3.4  K/uL


 


Monocytes # (Auto) 0.54   0.11-0.59  K/uL


 


Eosinophils # (Auto) 0.06   0-0.5  K/uL


 


Basophils # (Auto) 0.02   0-0.2  K/uL


 


RDW Standard Deviation 44.5   36.4-46.3  fL


 


RDW Coefficient of Variation 12.9   11.5-14.5  %


 


Immature Granulocyte % (Auto) 0.3    %


 


Immature Granulocyte # (Auto) 0.02   0.00-0.02  K/uL


 


Prothrombin Time


  10.7


  


  


  9.0-12.0


SECONDS


 


Prothromb Time International


Ratio 1.0


  


  


  0.9-1.1  


 


 


Activated Partial


Thromboplast Time 26.1


  


  


  21.0-31.0


SECONDS


 


Partial Thromboplastin Ratio 1.0    


 


Sodium Level 136   136-145  mmol/L


 


Potassium Level 3.8   3.5-5.1  mmol/L


 


Chloride Level 105     mmol/L


 


Carbon Dioxide Level 25   21-32  mmol/L


 


Anion Gap 6.0   3-11  mmol/L


 


Blood Urea Nitrogen 13   7-18  mg/dl


 


Creatinine


  0.54


  


  


  0.60-1.20


mg/dl


 


Est Creatinine Clear Calc


Drug Dose 70.6


  


  


   ml/min


 


 


Estimated GFR (


American) 102.6


  


  


   


 


 


Estimated GFR (Non-


American 88.5


  


  


   


 


 


BUN/Creatinine Ratio 25.0   10-20  


 


Random Glucose 110   70-99  mg/dl


 


Calcium Level 8.3   8.5-10.1  mg/dl


 


Magnesium Level 2.1   1.8-2.4  mg/dl


 


Total Bilirubin 0.4   0.2-1  mg/dl


 


Direct Bilirubin 0.1   0-0.2  mg/dl


 


Aspartate Amino Transf


(AST/SGOT) 15


  


  


  15-37  U/L


 


 


Alanine Aminotransferase


(ALT/SGPT) 17


  


  


  12-78  U/L


 


 


Alkaline Phosphatase 51     U/L


 


Total Creatine Kinase 71     U/L


 


Creatine Kinase MB 1.4   0.5-3.6  ng/ml


 


Creatine Kinase MB Ratio 2.0   0-3.0  


 


Total Protein 6.8   6.4-8.2  gm/dl


 


Albumin 3.6   3.4-5.0  gm/dl


 


Thyroid Stimulating Hormone


(TSH) 1.760


  


  


  0.300-4.500


uIu/ml


 


Bedside Troponin I  < 0.030  0-0.045  ng/ml


 


Urine Color   YELLOW  


 


Urine Appearance   CLEAR CLEAR  


 


Urine pH   6.5 4.5-7.5  


 


Urine Specific Gravity   1.016 1.000-1.030  


 


Urine Protein   NEG NEG  


 


Urine Glucose (UA)   NEG NEG  


 


Urine Ketones   1+ NEG  


 


Urine Occult Blood   NEG NEG  


 


Urine Nitrite   NEG NEG  


 


Urine Bilirubin   NEG NEG  


 


Urine Urobilinogen   NEG NEG  


 


Urine Leukocyte Esterase   SMALL NEG  


 


Urine WBC (Auto)   1-5 0-5  /hpf


 


Urine RBC (Auto)   0-4 0-4  /hpf


 


Urine Hyaline Casts (Auto)   1-5 0-5  /lpf


 


Urine Epithelial Cells (Auto)   20-30 0-5  /lpf


 


Urine Bacteria (Auto)   NEG NEG  











Diagnostic Radiology


CHEST ONE VIEW PORTABLE





CLINICAL HISTORY: rapid atrial fib, weakness dyspnea





COMPARISON STUDY:  9/24/2017





FINDINGS: Mild stable cardiomegaly. Prominent pulmonary vasculature compared to


the prior study consistent with a component of congestive failure. Mild


pulmonary hypertension may be present with slight fullness of the central


pulmonary vasculature. 





IMPRESSION:  


1. Developing congestive heart failure.








2. Developing pulmonary arterial hypertension. 

















The above report was generated using voice recognition software.  It may contain


grammatical, syntax or spelling errors.














Electronically signed by:  Herman Maurer M.D.


11/9/2017 7:16 AM





Dictated Date/Time:  11/9/2017 7:15 AM





 The status of this report is Signed.   


 Draft = Not yet reviewed or approved by Radiologist.  


 Signed = Reviewed and approved by Radiologist.





Study: CT angiography of the chest





HISTORY: Aneurysm. Dissection.





FINDINGS: Pre and postcontrast enhanced CT scans of the chest with attention to


the thoracic aorta demonstrate considerable tortuosity and ectasia of the


thoracic aorta. findings of mild aneurysmal dilatation of the proximal


descending thoracic aorta the maximum diameter of 3 cm. This is unchanged from


the prior exam.





There is a focal area of a moderate stenosis immediately proximal to the


aneurysmal distention. This also is





considered stable.





No well-defined evidence for dissection is appreciated. This area of narrowing


is approximately 1.6 cm in maximum diameter and is unchanged.











Pulmonary vasculature enhances appropriately. There may be a component of mild


pulmonary arterial hypertension. This is unchanged. A left renal cyst is stable.


Moderate pulmonary vascular prominence suggesting a component of mild congestive


failure





IMPRESSION:


1. Generalized atherosclerotic change and ectasia thoracic aorta.


2. Moderate stenosis apex aortic arch with mild aneurysmal dilatation of the


proximal descending thoracic aorta.


3. All findings are stable compared to the prior study with no new or interval


change.


4. No evidence for dissection.


5. Mild components of congestive failure.


6. Mild pulmonary arterial hypertension unaltered from the prior study.











Electronically signed by:  Herman Maurer M.D.


11/9/2017 10:23 AM





Dictated Date/Time:  11/9/2017 10:09 AM





 The status of this report is Signed.   


 Draft = Not yet reviewed or approved by Radiologist.  


Signed = Reviewed and approved by Radiologist.





EKG


MARIUSZ KWOK ID:[id number] 09-NOV-2017 06:45:49 [facility]


Atrial fibrillation with rapid ventricular response


Nonspecific ST abnormality


Abnormal ECG


When compared with ECG of 24-SEP-2017 06:56,


No significant change was found


25mm/s 10mm/mV 150Hz 8.0 SP2 12SL 241 JOHNNY: 13


Referred by: Referred Self Unconfirmed


Vent. rate 134 BPM


LA interval * ms


QRS duration 88 ms


QT/QTc 312/465 ms


P-R-T axes * 26 5


1935 (82 yr)


Female 


59in 1lb


Room:


Loc:15


Technician:CLAUDIO Jimenez ind:





MARIUSZ KWOK ID:[id number] 09-NOV-2017 07:18:02 [facility]


Atrial fibrillation


Abnormal ECG


When compared with ECG of 24-SEP-2017 06:56,


Vent. rate has decreased BY 45 BPM


Confirmed by Kocher, Christopher ([id number]) on 11/9/2017 10:37:29 AM


25mm/s 10mm/mV 150Hz 8.0 SP2 12SL 241 JOHNNY: 13


Referred by: Referred Self Confirmed By: Christopher Kocher


Vent. rate 77 BPM


LA interval * ms


QRS duration 88 ms


QT/QTc 390/441 ms


P-R-T axes * 34 2


1935 (82 yr)


Female 


59in 1lb


Room:


Loc:15


Technician:Stephanie Jimenez ind:





Impression


Assessment and Plan


Patient is a 81 y/o female, with PMHx of paroxysmal a.fib, HTN, 

hypercholesterolemia, and anxiety/depression, who presented to the ED because 

of complaints of back pain and palpitations.





A.fib w/ RVR:


- Admitted to tele for cardiac monitoring 


- Follow EKG QAM and PRN for chest pain 


- Cardiac enzymes negative x1 


- Treated w/ IV Cardizem 30 mg, then IV Lopressor 5 mg x1 and Digoxin 250 mcg x1

- still RVR at admission- give another dose of IV Digoxin at 1500


- Continue Metoprolol 25 mg BID and ASA 81 mg daily 


- IV Lopressor 5 mg q4 hrs PRN HR >120


- Patient has refused anticoagulation 


- TSH- WNL 


- Cardiology consulted, appreciate recommendations 





Anxiety: IV Ativan 0.5-1 mg q4 hrs PRN and PO 0.5 mg q6 hrs PRN 





s/p repair of a coarctation and PDA -- 1953: CTA unremarkable for acute 

findings 





Recent dental procedure: Clindamycin 150 mg QID- started on 11/9





HTN- STABLE: Continue Metoprolol as above 





DVT prophylaxis: TEDs/SCDs





Code Status: LEVEL I, FULL 





Dispo: From home- no discharge needs anticipated 





PA Physician Supervision Note:





I interviewed and examined the patient. Discussed with Liliana BRAUN and 

agree with findings and plan as documented in the note. Any exceptions or 

clarifications are listed here: None





Patient presented with palpitations was found to be in recurrent paroxysmal A. 

fib however did not break she feels that upon recent dental work and possible 

inflammation or infection.  In the past she's been refractory to want to take 

long-term oral anticoagulant in addition she has had a cardioversion with 

resumption of A. fib afterwards


Her vital signs show her blood pressure to be stable after initially was low 

after diltiazem bolus her heart rate was varying between 101 30 and an 

irregularly irregular rhythm


Recurrent paroxysmal atrial fibrillation


We'll observe in telemetry unit given additional digoxin dosing this evening 

for 12.5 on 11/8 continuing her metoprolol with when necessary IV use in hopes 

that with some rest and some slowing of her rate she'll convert back to sinus 

rhythm will not formally anticoagulate her at this time due to her previous 

refusal of long-term at the coagulation and will use clindamycin for dental 

complaints





Documented By:  Erasmo Crawford





Level of Care


Telemetry





Advanced Directives


Existing Living Will:  No


Existing Power of :  No





Resuscitation Status


FULL RESUSCITATION





VTE Prophylaxis


VTE Risk Assessment Done? Y/N:  Yes


Risk Level:  Moderate


Given or contraindicated:  T.E.D. Stockings, SCD's

## 2017-11-09 NOTE — DIAGNOSTIC IMAGING REPORT
Study: CT angiography of the chest



HISTORY: Aneurysm. Dissection.



FINDINGS: Pre and postcontrast enhanced CT scans of the chest with attention to

the thoracic aorta demonstrate considerable tortuosity and ectasia of the

thoracic aorta. findings of mild aneurysmal dilatation of the proximal

descending thoracic aorta the maximum diameter of 3 cm. This is unchanged from

the prior exam.



There is a focal area of a moderate stenosis immediately proximal to the

aneurysmal distention. This also is



considered stable.



No well-defined evidence for dissection is appreciated. This area of narrowing

is approximately 1.6 cm in maximum diameter and is unchanged.







Pulmonary vasculature enhances appropriately. There may be a component of mild

pulmonary arterial hypertension. This is unchanged. A left renal cyst is stable.

Moderate pulmonary vascular prominence suggesting a component of mild congestive

failure



IMPRESSION:

1. Generalized atherosclerotic change and ectasia thoracic aorta.

2. Moderate stenosis apex aortic arch with mild aneurysmal dilatation of the

proximal descending thoracic aorta.

3. All findings are stable compared to the prior study with no new or interval

change.

4. No evidence for dissection.

5. Mild components of congestive failure.

6. Mild pulmonary arterial hypertension unaltered from the prior study.







Electronically signed by:  Herman Maurer M.D.

11/9/2017 10:23 AM



Dictated Date/Time:  11/9/2017 10:09 AM

## 2017-11-09 NOTE — DIAGNOSTIC IMAGING REPORT
CHEST ONE VIEW PORTABLE



CLINICAL HISTORY: rapid atrial fib, weakness dyspnea



COMPARISON STUDY:  9/24/2017



FINDINGS: Mild stable cardiomegaly. Prominent pulmonary vasculature compared to

the prior study consistent with a component of congestive failure. Mild

pulmonary hypertension may be present with slight fullness of the central

pulmonary vasculature. 



IMPRESSION:  

1. Developing congestive heart failure.





2. Developing pulmonary arterial hypertension. 











The above report was generated using voice recognition software.  It may contain

grammatical, syntax or spelling errors.









Electronically signed by:  Herman Maurer M.D.

11/9/2017 7:16 AM



Dictated Date/Time:  11/9/2017 7:15 AM

## 2017-11-10 VITALS
TEMPERATURE: 97.52 F | SYSTOLIC BLOOD PRESSURE: 126 MMHG | OXYGEN SATURATION: 98 % | DIASTOLIC BLOOD PRESSURE: 58 MMHG | HEART RATE: 120 BPM

## 2017-11-10 VITALS
DIASTOLIC BLOOD PRESSURE: 61 MMHG | OXYGEN SATURATION: 97 % | TEMPERATURE: 97.34 F | SYSTOLIC BLOOD PRESSURE: 99 MMHG | HEART RATE: 79 BPM

## 2017-11-10 VITALS — DIASTOLIC BLOOD PRESSURE: 84 MMHG | SYSTOLIC BLOOD PRESSURE: 128 MMHG | HEART RATE: 101 BPM

## 2017-11-10 VITALS — OXYGEN SATURATION: 97 %

## 2017-11-10 VITALS
DIASTOLIC BLOOD PRESSURE: 60 MMHG | SYSTOLIC BLOOD PRESSURE: 105 MMHG | HEART RATE: 87 BPM | TEMPERATURE: 97.34 F | OXYGEN SATURATION: 96 %

## 2017-11-10 VITALS — DIASTOLIC BLOOD PRESSURE: 53 MMHG | HEART RATE: 93 BPM | SYSTOLIC BLOOD PRESSURE: 94 MMHG

## 2017-11-10 VITALS
OXYGEN SATURATION: 95 % | SYSTOLIC BLOOD PRESSURE: 122 MMHG | HEART RATE: 82 BPM | TEMPERATURE: 98.6 F | DIASTOLIC BLOOD PRESSURE: 60 MMHG

## 2017-11-10 VITALS — DIASTOLIC BLOOD PRESSURE: 55 MMHG | SYSTOLIC BLOOD PRESSURE: 94 MMHG | HEART RATE: 92 BPM

## 2017-11-10 VITALS — OXYGEN SATURATION: 98 %

## 2017-11-10 VITALS — HEART RATE: 108 BPM | SYSTOLIC BLOOD PRESSURE: 95 MMHG | DIASTOLIC BLOOD PRESSURE: 59 MMHG

## 2017-11-10 VITALS
HEART RATE: 104 BPM | DIASTOLIC BLOOD PRESSURE: 72 MMHG | TEMPERATURE: 97.52 F | SYSTOLIC BLOOD PRESSURE: 105 MMHG | OXYGEN SATURATION: 97 %

## 2017-11-10 VITALS — HEART RATE: 74 BPM | SYSTOLIC BLOOD PRESSURE: 87 MMHG | DIASTOLIC BLOOD PRESSURE: 57 MMHG

## 2017-11-10 VITALS — HEART RATE: 98 BPM | SYSTOLIC BLOOD PRESSURE: 98 MMHG | DIASTOLIC BLOOD PRESSURE: 65 MMHG

## 2017-11-10 LAB
ANION GAP SERPL CALC-SCNC: 7 MMOL/L (ref 3–11)
BASOPHILS # BLD: 0.03 K/UL (ref 0–0.2)
BASOPHILS NFR BLD: 0.5 %
BUN SERPL-MCNC: 10 MG/DL (ref 7–18)
BUN/CREAT SERPL: 20.2 (ref 10–20)
CALCIUM SERPL-MCNC: 8.1 MG/DL (ref 8.5–10.1)
CHLORIDE SERPL-SCNC: 109 MMOL/L (ref 98–107)
CO2 SERPL-SCNC: 24 MMOL/L (ref 21–32)
COMPLETE: YES
CREAT CL PREDICTED SERPL C-G-VRATE: 72.9 ML/MIN
CREAT SERPL-MCNC: 0.5 MG/DL (ref 0.6–1.2)
EOSINOPHIL NFR BLD AUTO: 202 K/UL (ref 130–400)
EOSINOPHIL NFR BLD AUTO: 215 K/UL (ref 130–400)
GLUCOSE SERPL-MCNC: 86 MG/DL (ref 70–99)
HCT VFR BLD CALC: 34.8 % (ref 37–47)
HCT VFR BLD CALC: 35.5 % (ref 37–47)
IG%: 0.3 %
IMM GRANULOCYTES NFR BLD AUTO: 29.6 %
INR PPP: 1 (ref 0.9–1.1)
LYMPHOCYTES # BLD: 1.83 K/UL (ref 1.2–3.4)
MAGNESIUM SERPL-MCNC: 2.2 MG/DL (ref 1.8–2.4)
MCH RBC QN AUTO: 30.8 PG (ref 25–34)
MCH RBC QN AUTO: 31.2 PG (ref 25–34)
MCHC RBC AUTO-ENTMCNC: 33.3 G/DL (ref 32–36)
MCHC RBC AUTO-ENTMCNC: 33.5 G/DL (ref 32–36)
MCV RBC AUTO: 92.3 FL (ref 80–100)
MCV RBC AUTO: 92.9 FL (ref 80–100)
MONOCYTES NFR BLD: 10.8 %
NEUTROPHILS # BLD AUTO: 1.6 %
NEUTROPHILS NFR BLD AUTO: 57.2 %
PARTIAL THROMBOPLASTIN RATIO: 1
PARTIAL THROMBOPLASTIN RATIO: 1.3
PARTIAL THROMBOPLASTIN RATIO: 5.9
PMV BLD AUTO: 10.6 FL (ref 7.4–10.4)
PMV BLD AUTO: 11 FL (ref 7.4–10.4)
POTASSIUM SERPL-SCNC: 3.9 MMOL/L (ref 3.5–5.1)
PROTHROMBIN TIME: 11.1 SECONDS (ref 9–12)
RBC # BLD AUTO: 3.77 M/UL (ref 4.2–5.4)
RBC # BLD AUTO: 3.82 M/UL (ref 4.2–5.4)
SODIUM SERPL-SCNC: 140 MMOL/L (ref 136–145)
WBC # BLD AUTO: 5.51 K/UL (ref 4.8–10.8)
WBC # BLD AUTO: 6.18 K/UL (ref 4.8–10.8)

## 2017-11-10 RX ADMIN — LORAZEPAM PRN MG: 2 INJECTION INTRAMUSCULAR; INTRAVENOUS at 17:24

## 2017-11-10 RX ADMIN — ALUMINUM HYDROXIDE, MAGNESIUM HYDROXIDE, AND DIMETHICONE PRN ML: 400; 400; 40 SUSPENSION ORAL at 08:23

## 2017-11-10 RX ADMIN — LORAZEPAM PRN MG: 2 INJECTION INTRAMUSCULAR; INTRAVENOUS at 17:22

## 2017-11-10 RX ADMIN — Medication SCH MG: at 07:52

## 2017-11-10 RX ADMIN — OXYCODONE HYDROCHLORIDE AND ACETAMINOPHEN SCH MG: 500 TABLET ORAL at 07:52

## 2017-11-10 RX ADMIN — CLINDAMYCIN HYDROCHLORIDE SCH MG: 150 CAPSULE ORAL at 13:34

## 2017-11-10 RX ADMIN — CLINDAMYCIN HYDROCHLORIDE SCH MG: 150 CAPSULE ORAL at 20:32

## 2017-11-10 RX ADMIN — LORAZEPAM PRN MG: 0.5 TABLET ORAL at 03:53

## 2017-11-10 RX ADMIN — CLINDAMYCIN HYDROCHLORIDE SCH MG: 150 CAPSULE ORAL at 17:00

## 2017-11-10 RX ADMIN — HEPARIN SODIUM PRN MLS/HR: 1000 INJECTION, SOLUTION INTRAVENOUS; SUBCUTANEOUS at 10:42

## 2017-11-10 RX ADMIN — METOPROLOL SUCCINATE SCH MG: 25 TABLET, EXTENDED RELEASE ORAL at 07:51

## 2017-11-10 RX ADMIN — Medication SCH INTERUNIT: at 07:52

## 2017-11-10 RX ADMIN — LORAZEPAM PRN MG: 0.5 TABLET ORAL at 10:00

## 2017-11-10 RX ADMIN — CLINDAMYCIN HYDROCHLORIDE SCH MG: 150 CAPSULE ORAL at 07:51

## 2017-11-10 RX ADMIN — HEPARIN SODIUM PRN MLS/HR: 1000 INJECTION, SOLUTION INTRAVENOUS; SUBCUTANEOUS at 21:32

## 2017-11-10 NOTE — CARDIOLOGY CONSULTATION
DATE OF CONSULTATION:  11/10/2017

 

PRIMARY PHYSICIAN:  Noemy Burns DO

 

PRIMARY CARDIOLOGIST:  Casper See MD

 

CONSULTATION:  Daniel Crain MD

 

ATTENDING AND REFERRING PHYSICIAN:  Erasmo Crawford MD

 

HISTORY OF PRESENT ILLNESS:  The patient is an 82-year-old white female with

a history of paroxysmal atrial fibrillation.  She has a longstanding history

of heart disease.  In 1954, she underwent cardiothoracic surgery for repair

of an aortic coarctation and patent ductus arteriosus.  This was performed at

the [facility] in [place] by Dr. Flo Aguilera.  This was in

the initial era of CT surgery.  She also has a history of aortic valvular

disease.  Her most recent echocardiogram performed in July of 2016 reveals

normal LV ejection fraction of 65%-70%, moderate concentric LVH, mild aortic

stenosis, mild-to-moderate aortic regurgitation, and mild mitral

regurgitation.  She has had episodes of paroxysmal atrial fibrillation in

September of 2015, July of 2016, 09/24/2017, and this current admission.  The

episode of atrial fibrillation in September 2015  had conversion of

atrial fibrillation to sinus rhythm while she was receiving intravenous

amiodarone.  The episode in 2016  spontaneously converted.  The episode

in September 2017 had spontaneous conversion to sinus rhythm.  The September 24th episode was felt to be related to hypokalemia (2.9) and stress from a

dental procedure.  The patient had a dental procedure on one of her upper

teeth.  She states that the procedure does not have the desired results. 

Since then, she has had pain.  The procedure was performed by a dentist

covering for her normal dentist.  Her normal dentist Dr. Jo feels that

she has to redo the procedure.  However, it cannot be redone for at least 2

weeks as he feels that she has inflammation at this site.  She was treated

with clindamycin 150 mg q. 6 hours p.o. on the September 24th admission. 

This antibiotic course was completed.

 

The patient states that she has done well from a cardiac standpoint from

September until the early hours of November 9th.  She developed palpitations

and vague sensation in her chest.  She also complained to the Emergency

Department physician on arrival to [facility] that she had back

discomfort.  Because of her history of aortic surgery, she underwent a CT

scan of her chest with contrast.  This revealed a moderate stenosis at the

apex of the aortic arch with mild aneurysmal dilatation of the proximal

descending thoracic aorta.  No significant change compared to a prior CT

scan.  It was reported that she had mild component of congestive heart

failure.  Generalized atherosclerotic changes and ectasia of the thoracic

aorta.

 

The patient was admitted to the telemetry unit.  This was yesterday

afternoon.  Cardiology consultation was ordered at 12:42 p.m.  The [facility] Cardiology office was not contacted regarding this

consultation.  There is a record in the on call service log from late last

evening of the consulting phone done from the telemetry unit.

 

I was made aware of this patient this morning.  The nursing staff does report

that this morning she had ventricular rates in the 40s-50s.  This was

transient.  The rates have since increased back into the 80s to low 100s.

 

The patient has previously refused anticoagulation therapy.  Because of this,

she was not placed on intravenous heparin.  In the past, she has been treated

with both Pradaxa and Eliquis.  She had adverse reactions to both

medications.  She refuses chronic anticoagulation therapy.

 

The patient was seen by me this morning in her telemetry unit room.  She

states that she has been under increased mental stress because of her dental

problems.  She does have a baseline history of anxiety, for which she takes

benzodiazepines.  This is in the form of lorazepam.

 

She states that since admission to telemetry unit, she has had no further

palpitations.  No back or chest discomfort.  She denies any lightheadedness

or syncope.  No orthopnea or PND.  On arrival to the Emergency Department,

she had complained of diffuse weakness.  No complaints of weakness today. 

She denies any cerebrovascular complaints suggestive of a thromboembolic

event.  No other symptoms suggestive of a thromboembolic event.  No bleeding

complaints.  She has mild discomfort in her upper mouth.  No fevers or

chills.

 

PAST MEDICAL HISTORY:

1.  Repair of aortic coarctation and patent ductus arteriosus in 1954.

2.  Aortic valvular disease as documented above.  Mild aortic stenosis and

mild to moderate aortic regurgitation.

3.  Paroxysmal atrial fibrillation.

4.  Type 2 diabetes mellitus.

5.  Dyslipidemia.

6.  Hypertension.

7.  History of depression and anxiety.

8.  History of gallbladder disease.

9.  Status post appendectomy, vein stripping, and tubal ligation.

 

FAMILY HISTORY:  No family history of coronary artery disease or arrhythmia. 

Family history of breast cancer, colon cancer, ovarian cancer, and leukemia.

 

SOCIAL HISTORY:  The patient is a .  She does not smoke cigarettes or

drink alcohol.  Four children.

 

ALLERGIES AND ADVERSE DRUG REACTIONS:  ELIQUIS, CYCLOBENZAPRINE, EPINEPHRINE,

FLU VIRUS VACCINE, PENICILLINS, AND PRADAXA.

 

CURRENT MEDICATIONS:  Aspirin 81 mg daily, vitamin E 400 units daily, vitamin

C 500 mg daily, metoprolol succinate ER 25 mg p.o. b.i.d., clindamycin 150 mg

q.i.d., and several p.r.n. medications.  She had received intravenous

diltiazem on November 9th.  She also received a dose of intravenous digoxin

250 mcg IV on the afternoon of November 9th.

 

REVIEW OF SYSTEMS:

1.  As above.

2.  Other than the dental pain, no other HEENT complaints.

3.  No pulmonary or urinary complaints.

4.  No peripheral vascular or cerebrovascular complaints.

5.  Anxiety.

 

PHYSICAL EXAMINATION:

GENERAL:  The patient is sitting up in her bed.  No distress.

VITAL SIGNS:  Ventricular rate at time of my exam by monitor was in the 80s

to low 100s.  Blood pressure last evening 110/71.  This morning 99/61.  Pulse

oximetry on room air 97%.  Ventricular rate on this admission has been as

high as 135.  This is the time of arrival to the Emergency Department.

GENERAL APPEARANCE:  Shows her to be in no distress.

HEAD:  Normal.

EYES:  Pupils equal and round.  Anicteric.  Conjunctivae normal.  No

xanthelasma.

NECK:  No jugular venous distension.  Carotids are 2/2 bilaterally.  Normal

upstroke.  No bruits.

LUNGS:  Normal respiratory effort.  Clear.  No rales or wheezes.

HEART:  Irregularly irregular.  No S3.  2/6 systolic murmur at the second

right intercostal space and left sternal border.  No diastolic murmur or rub.

ABDOMEN:  Soft.  Nontender.  No palpable masses or organomegaly.  No bruits.

EXTREMITIES:  No pretibial edema.  No cyanosis or clubbing.

NEUROLOGIC:  Alert and oriented x3.  Motor grossly intact.

PSYCHIATRIC:  Affect is normal.

PULSES:  Distal pulses are strongly palpable in all extremities.

 

DATA:  CT scan of chest with results as reported above.  There is no evidence

of aortic dissection.  Aortic abnormalities as noted.

 

Chest x-ray on admission reviewed by me with increased pulmonary vasculature.

 

Electrocardiogram in November 9th at 06:45 a.m. with atrial fibrillation with

rapid ventricular response of 134 beats per minute.  Inferolateral ST

depressions consistent with ischemia.  No significant change compared to

electrocardiogram on 09/24/2017.

 

Electrocardiogram performed this morning with atrial fibrillation with

ventricular rate of 77 beats per minute, inferolateral ST and T wave

abnormalities suggestive of ischemia.  The ST abnormalities improved from

admission electrocardiogram.

 

LABORATORY DATA:  Today with WBC 6.18, hemoglobin 11.9, hematocrit 35.5, and

platelet count 215.  INR 1.0.  PTT 25.4.  Point of care troponin I on

November 9th was less than 0.030.  Metabolic profile today with sodium 140,

potassium 3.9, chloride 109, carbon dioxide 24, BUN 10, creatinine 0.5,

random glucose 86 and magnesium 2.2.  TSH is 1.760.  AST and ALT are normal. 

Calcium today 8.1.  Serum albumin 3.6 yesterday.

 

ASSESSMENT:

1.  Longstanding history of paroxysmal atrial fibrillation.  Development of

atrial fibrillation with rapid ventricular response approximately 30 hours

ago.  Ventricular rate to atrial fibrillation has been controlled since

admission.  She did receive doses of intravenous diltiazem as well as

digoxin.  Current ventricular rate controlled and mildly elevated.  Transient

bradycardia this morning.  This has since resolved.

2.  No signs or symptoms of congestive heart failure.  Suggestion of

pulmonary vascular congestion on both CT scan of her chest and chest x-ray. 

Her lung exam today is normal.  Normal oxygen saturation on room air.

3.  History of mild aortic stenosis and mild to moderate aortic

regurgitation.  As stated above, no current signs or symptoms of congestive

heart failure.

4.  History of labile hypertension.  Blood pressure adequately controlled

this admission.

5.  The patient has previously refused long-term oral anticoagulation

therapy.  This was again discussed with her today by me.  She continues to

decline and refuse long term oral anticoagulation therapy.  After extensive

discussion with her today, she does agree to be placed on intravenous heparin

at this time.

6.  No signs or symptoms suggestive of a thromboembolic event.

7.  She does have an elevated [id number] score of 5.  This is a high risk

category.  Yearly risk of stroke is estimated at 6.7%.  Antithrombotic

recommendation is oral anticoagulation.  The patient is aware of this.  She

declines and refuses long term oral anticoagulation therapy.

8.  Electrocardiogram with ST abnormalities suggestive of ischemia.  With the

elevated ventricular rate on admission and her moderate left ventricular

hypertrophy, could represent demand ischemia.  Cardiac enzymes negative for

myocardial injury.  No anginal chest discomfort.  She did have complaints of

back discomfort on arrival to the Emergency Department.  This may have been

an anginal equivalent type symptom.

 

RECOMMENDATIONS AND PLAN:

1.  After a lengthy discussion, the patient is agreeable to starting

intravenous heparin at this time.  We will start her with a bolus and then

maintenance infusion.

2.  Additional 25 mg of metoprolol succinate ER now.

3.  Anxiolytic therapy with flurazepam.  Dose given this morning.

4.  We will reevaluate the patient in the afternoon.  The therapeutic options

of pharmacologic cardioversion and electrical cardioversion were extensively

discussed with her.  She will consider these options.  Based on her history,

it is likely that a pharmacologic cardioversion would be successful.

5.  Clear liquid diet at this time in the event that attempts at

cardioversion to be attempted later this afternoon.  Certainly if she

undergoes electrical cardioversion, she will need to be n.p.o.

6.  The patient states that the clindamycin causes her to have GI discomfort.

 She insisted that Dr. Burns had prescribed a different antibiotic at the

time of her hospital followup visit in October.  I contacted Dr. Burns's

office.  No record of her Dr. Burns prescribing any antibiotics.  I checked

with the patient's pharmacy and they have no record of any new prescriptions

for an antibiotic.  They do have a prescription for amoxicillin suspension to

take prior to dental procedures.

 

Greater than 45 minutes was spent by me with the patient today.  Over 50% of

time was spent in discussion regarding her atrial fibrillation, reduction of

thromboembolic risk with anticoagulation therapy, ventricular rate control of

atrial fibrillation, and therapeutic alternatives for cardioversion.

 

Thank you for asking us to see this patient in cardiology consultation.

 

 

 

OSMIN

## 2017-11-10 NOTE — PROGRESS NOTE
DATE: 11/10/2017

 

PROCEDURE:  Pharmacologic cardioversion.

 

CLINICAL INDICATIONS:  Atrial fibrillation.

 

PROTOCOL:  The patient was attached to a monitor in her room.  With direct

monitoring of her rhythm she was then given by me 0.52 mg of intravenous

ibutilide over 10 minutes.  Her weight is less than 60 kg.   The commended

dose of ibutilide with her weight is 0.01 mg/kg.  She weighs 52 kg.  During

the infusion of the ibutilide she had no ventricular arrhythmias.  She

persisted with atrial fibrillation.  She was observed for an additional 10

minutes and remained in atrial fibrillation.  She was then given an

additional 0.48 mg of intravenous ibutilide over 10 minutes.  Despite this

second dose of ibutilide she remained in atrial fibrillation.  She remained

without any ventricular arrhythmias.  She was asymptomatic during the

ibutilide infusion.

 

CONCLUSION:  Unsuccessful attempted pharmacologic cardioversion of atrial

fibrillation with ibutilide.

 

PLAN:  The patient will be placed back on intravenous heparin.  She will be

monitored for an additional 4 hours.  If she remains in atrial fibrillation

at that time she will be started on intravenous amiodarone.  The patient

steadfastly refuses oral anticoagulation therapy.  While hospitalized, she

will remain on intravenous anticoagulation with heparin.

## 2017-11-10 NOTE — PROGRESS NOTE
Subjective


Date of Service:


Nov 10, 2017.


Subjective


Today patient feels better her heart rate is around 100 she's had IV heparin 

instituted by Dr. rankin in cardiology he is contemplating chemical 

cardioversion.  The patient herself is unsure what to do and is requesting to 

speak with Dr. See every shot to him and he'll be overt speak the patient 

otherwise she has no complete or problems does not feel any more palpitation 

chest pain or shortness of breath





Problem List


Medical Problems:


(1) Acute chest wall pain


Status: Acute  





(2) Anxiety


Status: Acute  





(3) Atrial fibrillation with rapid ventricular response


Status: Acute  





(4) Chest discomfort


Status: Acute  





(5) Fatigue


Status: Acute  





(6) Hypokalemia


Status: Acute  





(7) Intermittent palpitations


Status: Acute  





(8) Shortness of breath


Status: Acute  





(9) Thoracic back pain


Status: Acute  











Review of Systems


ROS:


well nourished well developed





No double vision blurry vision


No problems with speech or swallowing


No palpitations, chest pain or pressure


No Wheezing or breathing issues


No abdominal pain nausea vomiting diarrhea changes in appetite or weight


No burning urine urine frequency or changes in color


No focal joint pain or muscle pain


No skin rashes or oral lesions


No unusual bruising or bleeding


No focused back pain or numbness or loss of strength


No changes in memory or confusion





Objective


Vital Signs











  Date Time  Temp Pulse Resp B/P (MAP) Pulse Ox O2 Delivery O2 Flow Rate FiO2


 


11/10/17 08:00     97 Room Air  


 


11/10/17 07:51 36.3 79 18 99/61 (74) 97 Room Air  


 


11/10/17 04:01 36.3 87 20 105/60 (75) 96 Room Air  


 


11/10/17 04:00      Room Air  


 


11/9/17 23:59      Room Air  


 


11/9/17 23:41 36.9 83 18 110/71 (84) 94 Room Air  


 


11/9/17 20:00      Room Air  


 


11/9/17 19:42 36.4 78 18 104/59 (74) 97 Room Air  


 


11/9/17 16:24 36.3 98 16 92/67 (75) 98   


 


11/9/17 15:02  72      


 


11/9/17 13:42  123 20 117/85 95   


 


11/9/17 13:35  123 20 117/85 95 Room Air  


 


11/9/17 13:01  114      


 


11/9/17 12:50 36.7 69 21 93/80  Room Air  











Physical Exam


General Appearance:  WD/WN, no apparent distress


Eyes:  PERRL, EOMI


Respiratory/Chest:  chest non-tender, lungs clear, normal breath sounds


Cardiovascular:  + tachycardia, + irregularly irregular


Abdomen:  normal bowel sounds, non tender, soft


Extremities:  no pedal edema, no calf tenderness


Neurologic/Psychiatric:  alert, oriented x 3





Laboratory Results





Last 24 Hours








Test


  11/10/17


05:46 11/10/17


10:06


 


White Blood Count 5.51 K/uL  6.18 K/uL 


 


Red Blood Count 3.77 M/uL  3.82 M/uL 


 


Hemoglobin 11.6 g/dL  11.9 g/dL 


 


Hematocrit 34.8 %  35.5 % 


 


Mean Corpuscular Volume 92.3 fL  92.9 fL 


 


Mean Corpuscular Hemoglobin 30.8 pg  31.2 pg 


 


Mean Corpuscular Hemoglobin


Concent 33.3 g/dl 


  33.5 g/dl 


 


 


RDW Standard Deviation 43.7 fL  44.7 fL 


 


RDW Coefficient of Variation 13.0 %  13.2 % 


 


Platelet Count 202 K/uL  215 K/uL 


 


Mean Platelet Volume 11.0 fL  10.6 fL 


 


Sodium Level 140 mmol/L  


 


Potassium Level 3.9 mmol/L  


 


Chloride Level 109 mmol/L  


 


Carbon Dioxide Level 24 mmol/L  


 


Anion Gap 7.0 mmol/L  


 


Blood Urea Nitrogen 10 mg/dl  


 


Creatinine 0.50 mg/dl  


 


Est Creatinine Clear Calc


Drug Dose 72.9 ml/min 


  


 


 


Estimated GFR (


American) 105.2 


  


 


 


Estimated GFR (Non-


American 90.8 


  


 


 


BUN/Creatinine Ratio 20.2  


 


Random Glucose 86 mg/dl  


 


Calcium Level 8.1 mg/dl  


 


Magnesium Level 2.2 mg/dl  


 


Neutrophils (%) (Auto)  57.2 % 


 


Lymphocytes (%) (Auto)  29.6 % 


 


Monocytes (%) (Auto)  10.8 % 


 


Eosinophils (%) (Auto)  1.6 % 


 


Basophils (%) (Auto)  0.5 % 


 


Neutrophils # (Auto)  3.53 K/uL 


 


Lymphocytes # (Auto)  1.83 K/uL 


 


Monocytes # (Auto)  0.67 K/uL 


 


Eosinophils # (Auto)  0.10 K/uL 


 


Basophils # (Auto)  0.03 K/uL 


 


Immature Granulocyte % (Auto)  0.3 % 


 


Immature Granulocyte # (Auto)  0.02 K/uL 


 


Prothrombin Time  11.1 SECONDS 


 


Prothromb Time International


Ratio 


  1.0 


 


 


Activated Partial


Thromboplast Time 


  25.4 SECONDS 


 


 


Partial Thromboplastin Ratio  1.0 











Assessment and Plan


83 y/o female, w with recurrent A. fib RVR and ith PMHx of paroxysmal a.fib, HTN

, hypercholesterolemia, and anxiety/depression, 





A.fib w/ RVR:


Continue Metoprolol 50 mg BID and ASA 81 mg daily 


- IV Lopressor 5 mg q4 hrs PRN HR >120


- Patient has refused anticoagulation in the past cardiology has started 

intravenous heparin with consideration of chemical cardioversion


- TSH- WNL 





Anxiety: Has been better controlled with  Ativan





s/p repair of a coarctation and PDA -- 1954: CTA unremarkable for acute 

findings showing chronic stenosis and other chronic changes 





Recent dental procedure: Reports improved subjective complaints Clindamycin 150 

mg QID- started on 11/9





HTN- STABLE: Continue Metoprolol as above 





DVT prophylaxis: TEDs/SCDs





Code Status: LEVEL I, FULL

## 2017-11-11 VITALS
OXYGEN SATURATION: 99 % | HEART RATE: 59 BPM | SYSTOLIC BLOOD PRESSURE: 103 MMHG | TEMPERATURE: 98.06 F | DIASTOLIC BLOOD PRESSURE: 58 MMHG

## 2017-11-11 VITALS
HEART RATE: 89 BPM | TEMPERATURE: 98.24 F | SYSTOLIC BLOOD PRESSURE: 116 MMHG | OXYGEN SATURATION: 97 % | DIASTOLIC BLOOD PRESSURE: 67 MMHG

## 2017-11-11 VITALS
DIASTOLIC BLOOD PRESSURE: 83 MMHG | TEMPERATURE: 97.52 F | HEART RATE: 60 BPM | OXYGEN SATURATION: 99 % | SYSTOLIC BLOOD PRESSURE: 134 MMHG

## 2017-11-11 VITALS
DIASTOLIC BLOOD PRESSURE: 58 MMHG | HEART RATE: 59 BPM | SYSTOLIC BLOOD PRESSURE: 103 MMHG | OXYGEN SATURATION: 99 % | TEMPERATURE: 98.06 F

## 2017-11-11 VITALS
SYSTOLIC BLOOD PRESSURE: 94 MMHG | DIASTOLIC BLOOD PRESSURE: 57 MMHG | OXYGEN SATURATION: 99 % | TEMPERATURE: 97.88 F | HEART RATE: 65 BPM

## 2017-11-11 LAB
ANION GAP SERPL CALC-SCNC: 8 MMOL/L (ref 3–11)
BUN SERPL-MCNC: 9 MG/DL (ref 7–18)
BUN/CREAT SERPL: 15.3 (ref 10–20)
CALCIUM SERPL-MCNC: 8.4 MG/DL (ref 8.5–10.1)
CHLORIDE SERPL-SCNC: 108 MMOL/L (ref 98–107)
CO2 SERPL-SCNC: 23 MMOL/L (ref 21–32)
CREAT CL PREDICTED SERPL C-G-VRATE: 62 ML/MIN
CREAT SERPL-MCNC: 0.59 MG/DL (ref 0.6–1.2)
EOSINOPHIL NFR BLD AUTO: 213 K/UL (ref 130–400)
GLUCOSE SERPL-MCNC: 96 MG/DL (ref 70–99)
HCT VFR BLD CALC: 36.3 % (ref 37–47)
MCH RBC QN AUTO: 31.4 PG (ref 25–34)
MCHC RBC AUTO-ENTMCNC: 34.2 G/DL (ref 32–36)
MCV RBC AUTO: 91.9 FL (ref 80–100)
PARTIAL THROMBOPLASTIN RATIO: 1.7
PMV BLD AUTO: 11.2 FL (ref 7.4–10.4)
POTASSIUM SERPL-SCNC: 3.9 MMOL/L (ref 3.5–5.1)
RBC # BLD AUTO: 3.95 M/UL (ref 4.2–5.4)
SODIUM SERPL-SCNC: 139 MMOL/L (ref 136–145)
WBC # BLD AUTO: 6.31 K/UL (ref 4.8–10.8)

## 2017-11-11 RX ADMIN — Medication SCH INTERUNIT: at 08:02

## 2017-11-11 RX ADMIN — CLINDAMYCIN HYDROCHLORIDE SCH MG: 150 CAPSULE ORAL at 08:02

## 2017-11-11 RX ADMIN — OXYCODONE HYDROCHLORIDE AND ACETAMINOPHEN SCH MG: 500 TABLET ORAL at 09:03

## 2017-11-11 RX ADMIN — Medication SCH MG: at 08:02

## 2017-11-11 RX ADMIN — CLINDAMYCIN HYDROCHLORIDE SCH MG: 150 CAPSULE ORAL at 12:50

## 2017-11-11 RX ADMIN — HEPARIN SODIUM PRN MLS/HR: 1000 INJECTION, SOLUTION INTRAVENOUS; SUBCUTANEOUS at 09:19

## 2017-11-11 NOTE — DISCHARGE INSTRUCTIONS
Discharge Instructions


Date of Service


Nov 11, 2017.





Admission


Reason for Admission:  AFIB





Discharge


Discharge Diagnosis / Problem:  atrial fibrillation - resolved





Discharge Goals


Goal(s):  Learn about illness, Diagnostic testing, Therapeutic intervention





Activity Recommendations


Activity Limitations:  resume your previous activity





.





Instructions / Follow-Up


Instructions / Follow-Up





1.  During your stay you were treated for atrial fibrillation.  You were seen 

by cardiology and started on amiodarone infusion and heparin infusion.  You 

converted back to normal sinus rhythm at 5am on 11/11/17.  The amiodarone and 

heparin infusions at that time were discontinued.  





2.  Dr. Crain has recommended taking toprol xl (metoprolol xl) 50mg twice a day.

  Please note the increased dose.  A new prescription has been provided for you.





3.  Dr. Crain and Dr. Crawford spoke to you about your high risk of stroke from 

the atrial fibrillation.  Anticoagulation ("blood thinners") were recommended.  

At this time you have elected to not start a blood thinner.  Please speak with 

Dr. See about this issue. 





4.  Please continue your aspirin 81mg twice a day. 





5.  See Dr. See in 1 week. 





6.  Call your dentist on Monday to schedule a follow-up for THIS WEEK.  Ok to 

stay off antibiotics at this time.  





7.  Return to [facility] if -


* you experience fluttering of the heart, palpitations, rapid heart rate, etc


* you develop shortness of breath or chest pain 


* any other concerns





Current Hospital Diet


Patient's current hospital diet: AHA Diet (Heart Healthy)





Discharge Diet


Recommended Diet:  AHA Diet (Heart Healthy)





Procedures


Procedures Performed:  


CAT scan of the lungs - NO evidence of dissection of the aorta OR large


aortic aneurysm.  evidence of prior thoracic surgery.





Pending Studies


Studies pending at discharge:  no





Medical Emergencies








.


Who to Call and When:





Medical Emergencies:  If at any time you feel your situation is an emergency, 

please call 911 immediately.





.





Non-Emergent Contact


Non-Emergency issues call your:  Cardiologist


Call Non-Emergent contact if:  you have any medication questions





.


.








"Provider Documentation" section prepared by Noman Kilpatrick.








.





VTE Core Measure


Inpt VTE Proph given/why not?:  Other Anticoagulation, T.E.D. Stockings, SCD's

## 2017-11-11 NOTE — PROGRESS NOTE
DATE: 11/11/2017

 

SUBJECTIVE:  The patient was seen by me this morning in the telemetry unit

room.  She states that she is feeling well from a physical standpoint.  She

did have difficulty sleeping overnight.  This is a common complaint of hers

when she is hospitalized.  No orthopnea or PND.  No chest pain or other

pains.  No palpitations, lightheadedness, syncope, or peripheral edema.  No

abdominal discomfort.  No GI complaints.  No pulmonary or urinary complaints.

 No leg pain.  No leg swelling.  No symptoms suggestive of a thromboembolic

event.

 

MEDICATIONS:  This morning were metoprolol succinate ER 50 mg b.i.d.,

intravenous amiodarone 0.5 mg per minute, intravenous heparin by weight based

protocol, aspirin 81 mg daily, vitamin E 400 units daily, vitamin C 500 mg

daily, clindamycin 150 mg q.i.d., and several p.r.n. medications.

 

ALLERGIES AND ADVERSE DRUG REACTIONS:  ELIQUIS, PRADAXA, CYCLOBENZAPRINE,

EPINEPHRINE, FLU VIRUS VACCINE, PENICILLINS.

 

Monitor history reviewed by me.  She reconverted to sinus rhythm this

morning.  Since then she has maintained sinus rhythm.

 

PHYSICAL EXAMINATION:

GENERAL APPEARANCE:  Shows her to be awake and alert.  No distress.

VITAL SIGNS:  This morning with oral temperature 36.4, pulse 60, blood

pressure 134/83, pulse oximetry room air 99%.

NECK:  No jugular venous distention.

LUNGS:  Normal respiratory effort.  Clear.  No rales or wheezes.

HEART:  Regular rate and rhythm.  Diminished aortic valvular closing sound. 

A 2/6 systolic murmur second right intercostal space and left sternal border.

 No diastolic murmur or rub.

ABDOMEN:  Soft.  Nontender.  No palpable masses or organomegaly.  No bruits.

EXTREMITIES:  No pretibial edema.  No calf tenderness.

NEUROLOGIC:  Alert and oriented x3.  Motor grossly intact.

PSYCHIATRIC:  Affect is normal.

 

DATA:  Electrocardiogram this morning and reviewed by me with sinus rhythm. 

First degree AV block with DE interval 252 milliseconds.  Corrected QT

interval normal at 406 milliseconds.  Nonspecific ST abnormalities.

 

LABORATORY DATA:  This morning with WBC 6.31, hemoglobin 12.4, hematocrit

36.3, platelet count 213.  Metabolic profile with potassium 3.9, BUN 9,

creatinine 0.59, random glucose 96.  PTT this morning 45.4.

 

ASSESSMENT:

1.  Paroxysmal atrial fibrillation.  Conversion to sinus rhythm overnight. 

She was started on intravenous amiodarone last evening.  Failed attempt at

pharmacologic cardioversion yesterday with intravenous ibutilide.

2.  No current cardiac symptoms.

3.  Aortic stenosis and aortic regurgitation.  No signs or symptoms of heart

failure, angina, lightheadedness, or syncope.

4.  History of hypertension.  Blood pressure under good control today.

5.  First degree atrioventricular block.  This is on a combination of both

amiodarone and metoprolol.  Also, she had received a dose of digoxin 2 days

ago.

6.  No signs or symptoms of heart failure.

7.  No signs or symptoms suggestive of thromboembolic event.

8.  No bleeding complaints on antithrombotic therapy with aspirin and

heparin.

 

RECOMMENDATIONS:

1.  Discontinue intravenous heparin and intravenous amiodarone.

2.  Continue higher dose of metoprolol succinate ER 50 mg b.i.d.  At the time

of admission, she was on 25 b.i.d.

3.  Discharge patient home.

4.  Cardiology followup with Dr. See in 1 week.

5.  The patient refuses oral anticoagulation therapy.  This has been

extensively discussed with her by me and Dr. See on multiple occasions. 

Extensive discussion yesterday.  She continues to refuse oral anticoagulation

therapy.

6.  The patient was advised to return to the hospital if she has any symptoms

suggestive of reoccurrence of atrial fibrillation.  If she has a reoccurrence

of atrial fibrillation in the near future, would then consider maintenance

oral amiodarone.

## 2017-11-27 ENCOUNTER — HOSPITAL ENCOUNTER (EMERGENCY)
Dept: HOSPITAL 45 - C.EDB | Age: 82
LOS: 1 days | Discharge: HOME | End: 2017-11-28
Payer: COMMERCIAL

## 2017-11-27 VITALS
WEIGHT: 114.64 LBS | BODY MASS INDEX: 19.57 KG/M2 | WEIGHT: 114.64 LBS | BODY MASS INDEX: 19.57 KG/M2 | HEIGHT: 64.02 IN | HEIGHT: 64.02 IN

## 2017-11-27 VITALS — TEMPERATURE: 98.06 F

## 2017-11-27 DIAGNOSIS — R06.02: Primary | ICD-10-CM

## 2017-11-27 DIAGNOSIS — I10: ICD-10-CM

## 2017-11-27 DIAGNOSIS — Z98.890: ICD-10-CM

## 2017-11-27 DIAGNOSIS — Z82.49: ICD-10-CM

## 2017-11-27 DIAGNOSIS — M54.6: ICD-10-CM

## 2017-11-27 DIAGNOSIS — Z79.82: ICD-10-CM

## 2017-11-27 DIAGNOSIS — E78.5: ICD-10-CM

## 2017-11-27 DIAGNOSIS — I48.91: ICD-10-CM

## 2017-11-27 LAB
BASOPHILS # BLD: 0.02 K/UL (ref 0–0.2)
BASOPHILS NFR BLD: 0.3 %
COMPLETE: YES
EOSINOPHIL NFR BLD AUTO: 228 K/UL (ref 130–400)
HCT VFR BLD CALC: 36.5 % (ref 37–47)
IG%: 0.4 %
IMM GRANULOCYTES NFR BLD AUTO: 22.4 %
LYMPHOCYTES # BLD: 1.7 K/UL (ref 1.2–3.4)
MCH RBC QN AUTO: 31 PG (ref 25–34)
MCHC RBC AUTO-ENTMCNC: 34.2 G/DL (ref 32–36)
MCV RBC AUTO: 90.6 FL (ref 80–100)
MONOCYTES NFR BLD: 8.2 %
NEUTROPHILS # BLD AUTO: 0.7 %
NEUTROPHILS NFR BLD AUTO: 68 %
PMV BLD AUTO: 10.6 FL (ref 7.4–10.4)
RBC # BLD AUTO: 4.03 M/UL (ref 4.2–5.4)
WBC # BLD AUTO: 7.58 K/UL (ref 4.8–10.8)

## 2017-11-28 VITALS — DIASTOLIC BLOOD PRESSURE: 84 MMHG | OXYGEN SATURATION: 98 % | HEART RATE: 79 BPM | SYSTOLIC BLOOD PRESSURE: 168 MMHG

## 2017-11-28 LAB
ALP SERPL-CCNC: 50 U/L (ref 45–117)
ALT SERPL-CCNC: 17 U/L (ref 12–78)
ANION GAP SERPL CALC-SCNC: 10 MMOL/L (ref 3–11)
AST SERPL-CCNC: 15 U/L (ref 15–37)
BUN SERPL-MCNC: 8 MG/DL (ref 7–18)
BUN/CREAT SERPL: 17.2 (ref 10–20)
CALCIUM SERPL-MCNC: 8.8 MG/DL (ref 8.5–10.1)
CHLORIDE SERPL-SCNC: 102 MMOL/L (ref 98–107)
CO2 SERPL-SCNC: 23 MMOL/L (ref 21–32)
CREAT CL PREDICTED SERPL C-G-VRATE: 73.9 ML/MIN
CREAT SERPL-MCNC: 0.49 MG/DL (ref 0.6–1.2)
GLUCOSE SERPL-MCNC: 89 MG/DL (ref 70–99)
POTASSIUM SERPL-SCNC: 3.5 MMOL/L (ref 3.5–5.1)
SODIUM SERPL-SCNC: 135 MMOL/L (ref 136–145)

## 2017-11-28 NOTE — DIAGNOSTIC IMAGING REPORT
SINGLE VIEW CHEST



CLINICAL HISTORY:  Atypical chest pain.



FINDINGS: An AP, portable, upright chest radiograph is compared to study dated

11/9/2017 and correlated with chest CT dated 7/25/2016. The examination is

degraded by portable technique and patient rotation.  The heart is enlarged and

there is atherosclerotic calcification of the thoracic aorta. The pulmonary

vasculature is noncongested. Chronic interstitial thickening is unchanged. There

is chronic elevation of the left hemidiaphragm and bibasilar atelectasis. No

airspace consolidation or pleural effusion is identified. No pneumothorax is

seen. The skeletal structures are osteopenic. The bony thorax is grossly intact.



IMPRESSION: Cardiomegaly with no acute cardiopulmonary abnormality.







Electronically signed by:  Javier Rendon M.D.

11/28/2017 7:18 AM



Dictated Date/Time:  11/28/2017 7:17 AM

## 2017-11-28 NOTE — EMERGENCY ROOM VISIT NOTE
History


Report prepared by Jamia:  Toan Mace


Under the Supervision of:  Dr. Garth Pleitez M.D.


First contact with patient:  23:27


Chief Complaint:  SHOULDER PAIN


Stated Complaint:  SHOULDER PAIN/HIP PAIN





History of Present Illness


The patient is a 82 year old female who presents to the Emergency Room with 

complaints of on and off bilateral shoulder pain for the past four days. The 

patient states that she had some shoulder pain and then saw a chiropractor and 

had a manipulation done. She states that afterwards she feels like her 

shoulders are in spasm off and on. The patient notes that she has an incision 

on her back from a cardiac surgery, and the pain is over the incision which 

worries her. She reports that she cannot take a full deep breath or yawn 

because of the pain, and she feels short of breath. The patient denies any 

nausea, vomiting, fevers, and chills. She states that today she had a fruit 

smoothie, and afterwards she had some diarrhea. The patient takes two baby 

aspiring daily, and she used to take Eliquis for A-fib but decided with her 

doctor that baby ASA daily would be sufficient.





   Source of History:  patient


   Onset:  four days ago


   Position:  shoulder (bilateral)


   Quality:  other (spasm)


   Timing:  other (on and off)


   Associated Symptoms:  + SOB, + diarrhea, No fevers, No chills, No nausea, No 

vomiting





Review of Systems


See HPI for pertinent positives and negatives.  A total of ten systems were 

reviewed and were otherwise negative.





Past Medical & Surgical


Medical Problems:


(1) Afib


(2) Aortic Valve Disorder


(3) History of repair of coarctation of aorta


(4) Hyperlipidemia Nec/Nos


(5) Hypertension Nos


(6) Patent ductus arteriosus


(7) Tubal Ligation Status








Family History





FHx: cancer


FHx: heart disease





Social History


Smoking Status:  Former Smoker


Alcohol Use:  none


Drug Use:  none


Marital Status:  


Housing Status:  lives alone


Occupation Status:  retired





Current/Historical Medications


Scheduled


Ascorbic Acid (Vitamin C), 500 MG PO DAILY


Aspirin (Aspirin Ec), 81 MG PO BID


Calcium W/ Magnesium (Calcium Magnesium 750), 1 TAB PO DAILY


Metoprolol Succinate (Toprol Xl), 1 TAB PO BID


Vitamin E (E-400), 400 UNITS PO DAILY


[Tumeric], Unknown Dose PO DAILY





Scheduled PRN


Lidocaine (Lidoderm Patch 5%), 1 PATCH TD DAILY PRN for Pain


Trolamine Salicylate (Aspercreme), 1 APPLN TOP AS DIRECTED PRN for Pain





Allergies


Coded Allergies:  


     Epinephrine (Verified  Allergy, Severe, TACHYCARDIA, 11/9/17)


     Apixaban (Verified  Allergy, Unknown, WEAKNESS/DIFFICULTY BREATHING, 11/9/ 17)


     Honey (Verified  Allergy, Unknown, ANAPHYLAXIS, 11/9/17)


 wild flower honey


     Penicillins (Verified  Allergy, Unknown, UNKNOWN, 11/9/17)


     Flu Virus Vaccine (Verified  Adverse Reaction, Intermediate, "ARTHRITIS 

SYMPTOMS", 11/9/17)


     Cyclobenzaprine (Verified  Adverse Reaction, Unknown, lethargy, 11/9/17)





Physical Exam


Vital Signs











  Date Time  Temp Pulse Resp B/P (MAP) Pulse Ox O2 Delivery O2 Flow Rate FiO2


 


11/28/17 01:30  79 18 168/84 98   


 


11/28/17 01:09  79 18 168/84 98 Room Air  


 


11/28/17 00:01  74      


 


11/27/17 23:59  77 18 147/89 98 Room Air  


 


11/27/17 23:58      Room Air  


 


11/27/17 22:59 36.7 82 18 179/93 98 Room Air  











Physical Exam


GENERAL: Awake, alert, well-appearing, in no distress


HENT: Normocephalic, atraumatic. Oropharynx unremarkable.


EYES: Normal conjunctiva. Sclera non-icteric.


NECK: Supple. No nuchal rigidity. FROM. No JVD.


RESPIRATORY: Clear to auscultation.


CARDIAC: 3/6 systolic murmur. Regular rate, normal rhythm. Extremities warm and 

well perfused. Pulses equal.


ABDOMEN: Soft, non-distended. No tenderness to palpation. No rebound or 

guarding. No masses.


RECTAL: Deferred.


MUSCULOSKELETAL: Left posterior lateral chest wall scar from childhood surgery. 

Mild tenderness to thoracic paraspinal muscles medially to the scapula 

bilaterally. Chest examination reveals no tenderness. The back is symmetrical 

on inspection without obvious abnormality. There is no CVA tenderness to 

palpation. No joint edema. 


LOWER EXTREMITIES: Calves are equal size bilaterally and non-tender. No edema. 

No discoloration. 


NEURO: Normal sensorium. No sensory or motor deficits noted. 


SKIN: No rash or jaundice noted.





Medical Decision & Procedures


ER Provider


Diagnostic Interpretation:


X-ray: Per my interpretation:





Chest One View: Normal mediastinum. No gross consolidations. No gross rib 

fractures.





Laboratory Results


11/27/17 23:50








Red Blood Count 4.03, Mean Corpuscular Volume 90.6, Mean Corpuscular Hemoglobin 

31.0, Mean Corpuscular Hemoglobin Concent 34.2, Mean Platelet Volume 10.6, 

Neutrophils (%) (Auto) 68.0, Lymphocytes (%) (Auto) 22.4, Monocytes (%) (Auto) 

8.2, Eosinophils (%) (Auto) 0.7, Basophils (%) (Auto) 0.3, Neutrophils # (Auto) 

5.16, Lymphocytes # (Auto) 1.70, Monocytes # (Auto) 0.62, Eosinophils # (Auto) 

0.05, Basophils # (Auto) 0.02





11/27/17 23:50

















Test


  11/27/17


23:50


 


White Blood Count


  7.58 K/uL


(4.8-10.8)


 


Red Blood Count


  4.03 M/uL


(4.2-5.4)


 


Hemoglobin


  12.5 g/dL


(12.0-16.0)


 


Hematocrit 36.5 % (37-47) 


 


Mean Corpuscular Volume


  90.6 fL


()


 


Mean Corpuscular Hemoglobin


  31.0 pg


(25-34)


 


Mean Corpuscular Hemoglobin


Concent 34.2 g/dl


(32-36)


 


Platelet Count


  228 K/uL


(130-400)


 


Mean Platelet Volume


  10.6 fL


(7.4-10.4)


 


Neutrophils (%) (Auto) 68.0 % 


 


Lymphocytes (%) (Auto) 22.4 % 


 


Monocytes (%) (Auto) 8.2 % 


 


Eosinophils (%) (Auto) 0.7 % 


 


Basophils (%) (Auto) 0.3 % 


 


Neutrophils # (Auto)


  5.16 K/uL


(1.4-6.5)


 


Lymphocytes # (Auto)


  1.70 K/uL


(1.2-3.4)


 


Monocytes # (Auto)


  0.62 K/uL


(0.11-0.59)


 


Eosinophils # (Auto)


  0.05 K/uL


(0-0.5)


 


Basophils # (Auto)


  0.02 K/uL


(0-0.2)


 


RDW Standard Deviation


  42.3 fL


(36.4-46.3)


 


RDW Coefficient of Variation


  12.7 %


(11.5-14.5)


 


Immature Granulocyte % (Auto) 0.4 % 


 


Immature Granulocyte # (Auto)


  0.03 K/uL


(0.00-0.02)


 


Anion Gap


  10.0 mmol/L


(3-11)


 


Est Creatinine Clear Calc


Drug Dose 73.9 ml/min 


 


 


Estimated GFR (


American) 105.9 


 


 


Estimated GFR (Non-


American 91.4 


 


 


BUN/Creatinine Ratio 17.2 (10-20) 


 


Calcium Level


  8.8 mg/dl


(8.5-10.1)


 


Total Bilirubin


  0.6 mg/dl


(0.2-1)


 


Direct Bilirubin


  0.2 mg/dl


(0-0.2)


 


Aspartate Amino Transf


(AST/SGOT) 15 U/L (15-37) 


 


 


Alanine Aminotransferase


(ALT/SGPT) 17 U/L (12-78) 


 


 


Alkaline Phosphatase


  50 U/L


()


 


Troponin I


  < 0.015 ng/ml


(0-0.045)


 


Pro-B-Type Natriuretic Peptide


  420 pg/ml


(0-1800)


 


Total Protein


  6.8 gm/dl


(6.4-8.2)


 


Albumin


  3.8 gm/dl


(3.4-5.0)


 


Lipase


  89 U/L


()








Laboratory results reviewed by me





Medications Administered











 Medications


  (Trade)  Dose


 Ordered  Sig/Vipin


 Route  Start Time


 Stop Time Status Last Admin


Dose Admin


 


 Ketorolac


 Tromethamine


  (Toradol Inj)  15 mg  NOW  STAT


 IV  11/27/17 23:35


 11/27/17 23:36 DC 11/27/17 23:53


15 MG


 


 Lidocaine


  (Lidoderm Patch


 5%)  1 patch  STK-MED ONCE


 .ROUTE  11/28/17 01:31


 11/28/17 01:32 DC 11/28/17 01:32


1 PATCH











ECG


Indication:  back/shoulder pain


Rate (beats per minute):  74


Rhythm:  normal sinus


Findings:  1st degree AV block, no acute ischemic change, other (normal axis)





ED Course


2327: The patient was evaluated in room B10. A complete history and physical 

exam was performed.





0119: I reevaluated the patient. Discussed results and discharge instructions: 

She verbalized understanding and agreement. The patient is ready for discharge.





Medical Decision


I reviewed the patient's past medical history, medications, and the nursing 

notes as described above.





Differential diagnoses include: musculoskeletal strain, ACS, thoracic aneurysm, 

dissection, pneumonia, bronchitis.





The patient is a 82-year-old woman with a remote history of a childhood surgery 

for repair of coarctation of the aorta presents to the emergency department 

with bilateral scapular pain after having manipulation by her chiropractor per 

history of present illness.  Arrival the patient is well-appearing, afebrile 

with stable vital signs.  Patient has mild tenderness on palpation to bilateral 

thoracic paraspinal muscles medial to the scapula.  No crepitus. 


Labs are unremarkable including WBC within normal limits.  Troponin negative in 

the setting of 4 days of symptoms.  EKG and Chest x-ray unremarkable.  The 

patient has reproducible tenderness in the paraspinal muscles; Muscular strain 

likely explains the patient's symptoms.  Moreover negative workup in the 

setting of 4 days of symptoms makes cardiac etiology less likely. Findings and 

plan for follow-up reviewed with patient. Patient agreeable and d/c'd per 

discharge instructions.





Medication Reconcilliation


Current Medication List:  was personally reviewed by me





Blood Pressure Screening


Patient's blood pressure:  Elevated blood pressure


Blood pressure disposition:  Referred to PCP





Impression





 Primary Impression:  


 Shortness of breath


 Additional Impression:  


 Back pain





Scribe Attestation


The scribe's documentation has been prepared under my direction and personally 

reviewed by me in its entirety. I confirm that the note above accurately 

reflects all work, treatment, procedures, and medical decision making performed 

by me.





Departure Information


Dispostion


Home / Self-Care





Prescriptions





Lidocaine (Lidoderm Patch 5%) 1 Ea Tdsy


1 PATCH TD DAILY Y for Pain, #10 PATCH


   12 hours on and 12 hours off to painful area.


   Prov: Garth Pleitez M.D.         11/28/17





Referrals


Noemy uBrns D.O. (PCP)





Forms


HOME CARE DOCUMENTATION FORM,                                                 

               IMPORTANT VISIT INFORMATION





Patient Instructions


Back Pain Relieve, ED Dyspnea Shortness of Breath, ED Muscle Aching, My [facility] 

[employer]





Additional Instructions





Please follow up with your primary care physician in the next 1-3 days for re-

evaluation.





You likely have a muscle strain.


Otherwise, your exam, EKG, chest xray, and lab results did not show signs of an 

emergent condition at this time.





Acetaminophen or Ibuprofen for pain as needed.


Lidoderm patches for additional pain relief. 





Return to the emergency department for worsening symptoms as described in the 

accompanying instructions.





Problem Qualifiers

## 2017-12-11 ENCOUNTER — HOSPITAL ENCOUNTER (EMERGENCY)
Dept: HOSPITAL 45 - C.EDB | Age: 82
Discharge: HOME | End: 2017-12-11
Payer: COMMERCIAL

## 2017-12-11 VITALS — HEART RATE: 63 BPM | DIASTOLIC BLOOD PRESSURE: 82 MMHG | OXYGEN SATURATION: 99 % | SYSTOLIC BLOOD PRESSURE: 168 MMHG

## 2017-12-11 VITALS — HEIGHT: 64.02 IN

## 2017-12-11 DIAGNOSIS — Z79.82: ICD-10-CM

## 2017-12-11 DIAGNOSIS — I10: ICD-10-CM

## 2017-12-11 DIAGNOSIS — S39.012A: Primary | ICD-10-CM
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
## 2017-12-11 DIAGNOSIS — Z82.49: ICD-10-CM

## 2017-12-11 NOTE — DIAGNOSTIC IMAGING REPORT
CHEST 2 VIEWS ROUTINE



CLINICAL HISTORY: back spasms causing shortness of breath dyspnea



COMPARISON STUDY:  11/27/2017



FINDINGS: Mild stable cardiomegaly. Lungs are clear. Diaphragms smooth. 



IMPRESSION:  Mild stable cardiomegaly. Lungs remain clear. 











The above report was generated using voice recognition software.  It may contain

grammatical, syntax or spelling errors.









Electronically signed by:  Herman Maurer M.D.

12/11/2017 6:32 AM



Dictated Date/Time:  12/11/2017 6:31 AM

## 2017-12-11 NOTE — EMERGENCY ROOM VISIT NOTE
History


Report prepared by Jamia:  Toan Mace


Under the Supervision of:  Dr. Kevyn Gold M.D.


First contact with patient:  01:47


Chief Complaint:  BACK PAIN


Stated Complaint:  PRESSURE IN BACK/CHEST,HAVEING TROUBLE BREATHING





History of Present Illness


The patient is an 82 year old female who presents to the Emergency Room with 

complaints of worsening lower back pain for the past 3 days. The patient states 

that the pain is radiating upward to the shoulders, and she has been having 

muscle spasms. She additionally states that she has been having some shortness 

of breath since she feels like she is unable to take a full breath. She notes 

that the pain is in the location of her previous fractures. The patient reports 

that she took a half of an Ativan around 2000 tonight. She states that she 

recently tried to exercise today. The patient denies any chest pain, fever, 

vomiting, urinary symptoms, diarrhea, and falls, and any leg swelling.





   Source of History:  patient


   Onset:  three days ago


   Position:  back


   Quality:  other (spasm)


   Timing:  worsening


   Associated Symptoms:  + SOB, No fevers, No chest pain, No diarrhea, No 

urinary symptoms





Review of Systems


See HPI for pertinent positives & negatives. A total of 10 systems reviewed and 

were otherwise negative.





Past Medical & Surgical


Medical Problems:


(1) Afib


(2) Aortic Valve Disorder


(3) History of repair of coarctation of aorta


(4) Hyperlipidemia Nec/Nos


(5) Hypertension Nos


(6) Patent ductus arteriosus


(7) Tubal Ligation Status








Family History





FHx: cancer


FHx: heart disease





Social History


Smoking Status:  Never Smoker


Alcohol Use:  none


Drug Use:  none


Marital Status:  


Housing Status:  lives alone


Occupation Status:  retired





Current/Historical Medications


Scheduled


Ascorbic Acid (Vitamin C), 500 MG PO DAILY


Aspirin (Aspirin Ec), 81 MG PO BID


Calcium W/ Magnesium (Calcium Magnesium 750), 1 TAB PO DAILY


Metoprolol Succinate (Metoprolol Succinate ER), 25 MG PO BID


Vitamin E (E-400), 400 UNITS PO DAILY


[Tumeric], Unknown Dose PO DAILY





Scheduled PRN


Diazepam (Valium), 2.5 MG PO Q6H PRN for Pain


Lidocaine (Lidoderm Patch 5%), 1 PATCH TD DAILY PRN for Pain


Lorazepam (Lorazepam), 0.25 MG PO QID PRN for Muscle Spasms


Trolamine Salicylate (Aspercreme), 1 APPLN TOP AS DIRECTED PRN for Pain





Allergies


Coded Allergies:  


     Epinephrine (Verified  Allergy, Severe, TACHYCARDIA, 12/11/17)


     Apixaban (Verified  Allergy, Unknown, WEAKNESS/DIFFICULTY BREATHING, 12/11/ 17)


     Honey (Verified  Allergy, Unknown, ANAPHYLAXIS, 12/11/17)


 wild flower honey


     Penicillins (Verified  Allergy, Unknown, UNKNOWN, 12/11/17)


     Flu Virus Vaccine (Verified  Adverse Reaction, Intermediate, "ARTHRITIS 

SYMPTOMS", 12/11/17)


     Cyclobenzaprine (Verified  Adverse Reaction, Unknown, lethargy, 12/11/17)





Physical Exam


Vital Signs











  Date Time  Temp Pulse Resp B/P (MAP) Pulse Ox O2 Delivery O2 Flow Rate FiO2


 


12/11/17 03:55  63 14 168/82 99   


 


12/11/17 03:55  63 14 168/82 99   


 


12/11/17 03:09  63 14 134/69    


 


12/11/17 02:27  65 23 194/93 99   


 


12/11/17 02:02  64      


 


12/11/17 01:58  67 22 167/84    


 


12/11/17 01:32  80 20 173/79 100 Room Air  











Physical Exam


GENERAL: Patient is anxious but well appearing and in no acute distress.


HEENT: No acute trauma, normocephalic atraumatic, mucous membranes moist, no 

nasal congestion, no scleral icterus.


NECK: No stridor, no adenopathy, no meningismus, trachea is midline.


LUNGS: No dyspnea. Clear to auscultation and equal bilaterally. No wheeze, no 

rhonchi.


HEART: Regular rate and rhythm.  No murmurs, rubs, gallops appreciated.


ABDOMEN: Soft, nontender, bowel sounds positive, no masses appreciated, no 

peritonitis.


BACK: Right lumbar paraspinal tenderness to palpation and spasm.


EXTREMITIES: Normal motion all extremities, no cyanosis, no edema.


NEUROLOGIC: Alert and oriented, no acute motor or sensory deficits, no focal 

weakness, cranial nerves grossly intact.


SKIN: No rash, no jaundice, no diaphoresis.





Medical Decision & Procedures


ER Provider


Diagnostic Interpretation:


X ray results are stated below per my interpretation: 








3 View Lumbar Spine: No fracture. No dislocation





2 View Chest: Chronic lung disease. Diffuse arthritic changes. No infiltrate. 

No effusions. No thoracic fractures.





Medications Administered











 Medications


  (Trade)  Dose


 Ordered  Sig/Vipin


 Route  Start Time


 Stop Time Status Last Admin


Dose Admin


 


 Diazepam


  (Valium Tab)  2.5 mg  NOW  STAT


 PO  12/11/17 02:21


 12/11/17 02:22 DC 12/11/17 02:30


2.5 MG


 


 Diazepam


  (Valium Tab)  5 mg  NOW  STAT


 PO  12/11/17 03:23


 12/11/17 03:24 DC 12/11/17 03:51


5 MG











ED Course


0147: The patient was evaluated in room A10. A complete history and physical 

exam was performed.





0316: I reevaluated the patient, and she had complete resolution of her pain 

and would like to go home. Case management is going to get in touch with her to 

follow up with her PCP. The patient will be discharged home.





Medical Decision


Differential: Musculoskeletal, Disc Herniation, Fracture, Cord Compression, 

Discitis, Infectious, Aortic Pathology, Renal Colic, UTI/Pyelonephritis, Acute 

Exacerbation of Chronic Pain, Sciatica, Cauda Equina, amongst other pathologies 

entertained.





Anxious 82 yr old female known well to me from previous visits.  Admits 

increased exertion yesterday and now spasm of lower back.  Seems this has 

caused he to feel SOB as symptoms resolved immediately with small dose Valium.  

She looks well and is in no distress.  Imaging OK.  No leg weakness nor loss 

bowel/bladder.  Not consistent with renal nor aortic path and she denies UTI 

symptoms. She admits this has occurred previously on regular basis and Ativan 

not as effective for it.  Advised follow up with PCP and Casemanagement will 

touch base with patient as well.





Medication Reconcilliation


Current Medication List:  was personally reviewed by me





Blood Pressure Screening


Patient's blood pressure:  Elevated blood pressure


Blood pressure disposition:  Elevated BP felt to be situational, Referred to PCP





Impression





 Primary Impression:  


 Strain of lumbar region





Scribe Attestation


The scribe's documentation has been prepared under my direction and personally 

reviewed by me in its entirety. I confirm that the note above accurately 

reflects all work, treatment, procedures, and medical decision making performed 

by me.





Departure Information


Dispostion


Home / Self-Care





Prescriptions





Diazepam (Valium) 5 Mg Tab


2.5 MG PO Q6H Y for Pain, #10 TAB


   Prov: Kevyn Gold M.D.         12/11/17





Referrals


Grine, Noemy M.,D.O. (PCP)





Forms


HOME CARE DOCUMENTATION FORM,                                                 

               IMPORTANT VISIT INFORMATION





Patient Instructions


ED Spasm Back No Trauma, My [facility]





Additional Instructions





Follow up with Dr Burns.


You have received a benzodiazepine pain medication prescription.  These 

medications may cause drowsiness and should not be used with other sedative 

medications.  Do not drive, drink alcohol, perform dangerous activities, nor 

make important decisions after taking these medications.  Long term use or 

inappropriate use may lead to addiction.

## 2017-12-11 NOTE — DIAGNOSTIC IMAGING REPORT
LUMBAR SPINE 2 OR 3 VIEWS



CLINICAL HISTORY: 82 years-old Female presenting with lumbar back pain. 



TECHNIQUE: Frontal, lateral, and coned in lateral views of the lumbar spine were

obtained. 



COMPARISON: 8/1/2011.



FINDINGS:

No significant scoliosis. Normal lumbar lordosis. Vertebral bodies maintain

normal height and alignment. Osteopenia suggested. Intervertebral disc spaces

preserved. No advanced degenerative change. No radiographic evidence of a

compression deformity or subluxation.



Degenerative changes of the sacroiliac joints.



Marked stool burden in the right colon, sigmoid colon, and rectum.

Atherosclerosis.



IMPRESSION:

1.  Osteopenia suggested.



2.  No advanced degenerative changes in the lumbar spine.



3.  Findings suggestive of constipation.







Electronically signed by:  Fabian Pendleton M.D.

12/11/2017 6:54 AM



Dictated Date/Time:  12/11/2017 6:52 AM

## 2018-08-01 ENCOUNTER — HOSPITAL ENCOUNTER (OUTPATIENT)
Dept: HOSPITAL 45 - C.LABBC | Age: 83
Discharge: HOME | End: 2018-08-01
Payer: COMMERCIAL

## 2018-08-01 DIAGNOSIS — I48.0: Primary | ICD-10-CM

## 2018-08-01 DIAGNOSIS — E87.6: ICD-10-CM

## 2018-08-01 LAB
BUN SERPL-MCNC: 17 MG/DL (ref 7–18)
CALCIUM SERPL-MCNC: 8.9 MG/DL (ref 8.5–10.1)
CO2 SERPL-SCNC: 27 MMOL/L (ref 21–32)
CREAT SERPL-MCNC: 0.59 MG/DL (ref 0.6–1.2)
GLUCOSE SERPL-MCNC: 92 MG/DL (ref 70–99)
POTASSIUM SERPL-SCNC: 4.3 MMOL/L (ref 3.5–5.1)
SODIUM SERPL-SCNC: 136 MMOL/L (ref 136–145)